# Patient Record
Sex: FEMALE | Race: WHITE | Employment: FULL TIME | URBAN - NONMETROPOLITAN AREA
[De-identification: names, ages, dates, MRNs, and addresses within clinical notes are randomized per-mention and may not be internally consistent; named-entity substitution may affect disease eponyms.]

---

## 2022-07-22 ENCOUNTER — HOSPITAL ENCOUNTER (OUTPATIENT)
Age: 27
Setting detail: OBSERVATION
Discharge: HOME | End: 2022-07-24
Attending: EMERGENCY MEDICINE | Admitting: INTERNAL MEDICINE
Payer: MEDICAID

## 2022-07-22 ENCOUNTER — APPOINTMENT (OUTPATIENT)
Dept: ULTRASOUND IMAGING | Age: 27
End: 2022-07-22
Attending: EMERGENCY MEDICINE
Payer: MEDICAID

## 2022-07-22 DIAGNOSIS — K81.0 ACUTE CHOLECYSTITIS: Primary | ICD-10-CM

## 2022-07-22 LAB
ALBUMIN SERPL-MCNC: 3.6 G/DL (ref 3.4–5)
ALBUMIN/GLOB SERPL: 0.9 {RATIO} (ref 0.8–1.7)
ALP SERPL-CCNC: 66 U/L (ref 45–117)
ALT SERPL-CCNC: 43 U/L (ref 13–56)
ANION GAP SERPL CALC-SCNC: 8 MMOL/L (ref 3–18)
APPEARANCE UR: CLEAR
AST SERPL W P-5'-P-CCNC: 26 U/L (ref 10–38)
BACTERIA URNS QL MICRO: ABNORMAL /HPF
BASOPHILS # BLD: 0 K/UL (ref 0–0.1)
BASOPHILS NFR BLD: 1 % (ref 0–2)
BILIRUB DIRECT SERPL-MCNC: <0.1 MG/DL (ref 0–0.2)
BILIRUB SERPL-MCNC: 0.3 MG/DL (ref 0.2–1)
BILIRUB UR QL: NEGATIVE
BUN SERPL-MCNC: 14 MG/DL (ref 7–18)
BUN/CREAT SERPL: 16 (ref 12–20)
CA-I BLD-MCNC: 9.2 MG/DL (ref 8.5–10.1)
CAOX CRY URNS QL MICRO: ABNORMAL
CHLORIDE SERPL-SCNC: 106 MMOL/L (ref 100–111)
CO2 SERPL-SCNC: 26 MMOL/L (ref 21–32)
COLOR UR: YELLOW
CREAT SERPL-MCNC: 0.87 MG/DL (ref 0.6–1.3)
DIFFERENTIAL METHOD BLD: ABNORMAL
EOSINOPHIL # BLD: 0.4 K/UL (ref 0–0.4)
EOSINOPHIL NFR BLD: 6 % (ref 0–5)
EPITH CASTS URNS QL MICRO: ABNORMAL /LPF (ref 0–20)
ERYTHROCYTE [DISTWIDTH] IN BLOOD BY AUTOMATED COUNT: 15.9 % (ref 11.6–14.5)
GLOBULIN SER CALC-MCNC: 3.9 G/DL (ref 2–4)
GLUCOSE SERPL-MCNC: 101 MG/DL (ref 74–99)
GLUCOSE UR STRIP.AUTO-MCNC: NEGATIVE MG/DL
HCG UR QL: NEGATIVE
HCT VFR BLD AUTO: 37.7 % (ref 35–45)
HGB BLD-MCNC: 11.8 G/DL (ref 12–16)
HGB UR QL STRIP: ABNORMAL
IMM GRANULOCYTES # BLD AUTO: 0 K/UL (ref 0–0.04)
IMM GRANULOCYTES NFR BLD AUTO: 0 % (ref 0–0.5)
KETONES UR QL STRIP.AUTO: NEGATIVE MG/DL
LEUKOCYTE ESTERASE UR QL STRIP.AUTO: NEGATIVE
LIPASE SERPL-CCNC: 128 U/L (ref 73–393)
LYMPHOCYTES # BLD: 1.7 K/UL (ref 0.9–3.6)
LYMPHOCYTES NFR BLD: 29 % (ref 21–52)
MAGNESIUM SERPL-MCNC: 2 MG/DL (ref 1.6–2.6)
MCH RBC QN AUTO: 26 PG (ref 24–34)
MCHC RBC AUTO-ENTMCNC: 31.3 G/DL (ref 31–37)
MCV RBC AUTO: 83 FL (ref 78–100)
MONOCYTES # BLD: 0.5 K/UL (ref 0.05–1.2)
MONOCYTES NFR BLD: 8 % (ref 3–10)
NEUTS SEG # BLD: 3.2 K/UL (ref 1.8–8)
NEUTS SEG NFR BLD: 56 % (ref 40–73)
NITRITE UR QL STRIP.AUTO: NEGATIVE
NRBC # BLD: 0 K/UL (ref 0–0.01)
NRBC BLD-RTO: 0 PER 100 WBC
PH UR STRIP: 6 [PH] (ref 5–8)
PLATELET # BLD AUTO: 220 K/UL (ref 135–420)
PMV BLD AUTO: 11.2 FL (ref 9.2–11.8)
POTASSIUM SERPL-SCNC: 3.6 MMOL/L (ref 3.5–5.5)
PROT SERPL-MCNC: 7.5 G/DL (ref 6.4–8.2)
PROT UR STRIP-MCNC: NEGATIVE MG/DL
RBC # BLD AUTO: 4.54 M/UL (ref 4.2–5.3)
RBC #/AREA URNS HPF: ABNORMAL /HPF (ref 0–2)
SODIUM SERPL-SCNC: 140 MMOL/L (ref 136–145)
SP GR UR REFRACTOMETRY: 1.03 (ref 1–1.03)
UROBILINOGEN UR QL STRIP.AUTO: 0.2 EU/DL (ref 0.2–1)
WBC # BLD AUTO: 5.8 K/UL (ref 4.6–13.2)
WBC URNS QL MICRO: ABNORMAL /HPF (ref 0–4)

## 2022-07-22 PROCEDURE — 74011000250 HC RX REV CODE- 250: Performed by: NURSE PRACTITIONER

## 2022-07-22 PROCEDURE — 82248 BILIRUBIN DIRECT: CPT

## 2022-07-22 PROCEDURE — 74011250636 HC RX REV CODE- 250/636: Performed by: EMERGENCY MEDICINE

## 2022-07-22 PROCEDURE — 81025 URINE PREGNANCY TEST: CPT

## 2022-07-22 PROCEDURE — 74011250636 HC RX REV CODE- 250/636: Performed by: SURGERY

## 2022-07-22 PROCEDURE — 74011000258 HC RX REV CODE- 258: Performed by: NURSE PRACTITIONER

## 2022-07-22 PROCEDURE — 83735 ASSAY OF MAGNESIUM: CPT

## 2022-07-22 PROCEDURE — 85025 COMPLETE CBC W/AUTO DIFF WBC: CPT

## 2022-07-22 PROCEDURE — 81001 URINALYSIS AUTO W/SCOPE: CPT

## 2022-07-22 PROCEDURE — 83690 ASSAY OF LIPASE: CPT

## 2022-07-22 PROCEDURE — 76705 ECHO EXAM OF ABDOMEN: CPT

## 2022-07-22 PROCEDURE — 80053 COMPREHEN METABOLIC PANEL: CPT

## 2022-07-22 PROCEDURE — 36415 COLL VENOUS BLD VENIPUNCTURE: CPT

## 2022-07-22 PROCEDURE — 99285 EMERGENCY DEPT VISIT HI MDM: CPT

## 2022-07-22 PROCEDURE — 96374 THER/PROPH/DIAG INJ IV PUSH: CPT

## 2022-07-22 PROCEDURE — 74011000258 HC RX REV CODE- 258: Performed by: EMERGENCY MEDICINE

## 2022-07-22 PROCEDURE — 65270000029 HC RM PRIVATE

## 2022-07-22 PROCEDURE — G0378 HOSPITAL OBSERVATION PER HR: HCPCS

## 2022-07-22 PROCEDURE — 74011250636 HC RX REV CODE- 250/636: Performed by: NURSE PRACTITIONER

## 2022-07-22 PROCEDURE — 96376 TX/PRO/DX INJ SAME DRUG ADON: CPT

## 2022-07-22 PROCEDURE — 96375 TX/PRO/DX INJ NEW DRUG ADDON: CPT

## 2022-07-22 RX ORDER — ONDANSETRON 2 MG/ML
4 INJECTION INTRAMUSCULAR; INTRAVENOUS
Status: DISCONTINUED | OUTPATIENT
Start: 2022-07-22 | End: 2022-07-24 | Stop reason: HOSPADM

## 2022-07-22 RX ORDER — DEXTROSE, SODIUM CHLORIDE, AND POTASSIUM CHLORIDE 5; .45; .15 G/100ML; G/100ML; G/100ML
125 INJECTION INTRAVENOUS CONTINUOUS
Status: DISCONTINUED | OUTPATIENT
Start: 2022-07-22 | End: 2022-07-24 | Stop reason: HOSPADM

## 2022-07-22 RX ORDER — MORPHINE SULFATE 4 MG/ML
4 INJECTION, SOLUTION INTRAMUSCULAR; INTRAVENOUS
Status: COMPLETED | OUTPATIENT
Start: 2022-07-22 | End: 2022-07-22

## 2022-07-22 RX ORDER — POLYETHYLENE GLYCOL 3350 17 G/17G
17 POWDER, FOR SOLUTION ORAL DAILY PRN
Status: DISCONTINUED | OUTPATIENT
Start: 2022-07-22 | End: 2022-07-23

## 2022-07-22 RX ORDER — NORGESTIMATE AND ETHINYL ESTRADIOL 0.25-0.035
1 KIT ORAL DAILY
COMMUNITY

## 2022-07-22 RX ORDER — SODIUM CHLORIDE 450 MG/100ML
100 INJECTION, SOLUTION INTRAVENOUS CONTINUOUS
Status: DISCONTINUED | OUTPATIENT
Start: 2022-07-22 | End: 2022-07-22

## 2022-07-22 RX ORDER — MORPHINE SULFATE 2 MG/ML
2 INJECTION, SOLUTION INTRAMUSCULAR; INTRAVENOUS
Status: DISCONTINUED | OUTPATIENT
Start: 2022-07-22 | End: 2022-07-24 | Stop reason: HOSPADM

## 2022-07-22 RX ORDER — ONDANSETRON 4 MG/1
4 TABLET, ORALLY DISINTEGRATING ORAL
Status: DISCONTINUED | OUTPATIENT
Start: 2022-07-22 | End: 2022-07-24 | Stop reason: HOSPADM

## 2022-07-22 RX ORDER — ACETAMINOPHEN 325 MG/1
650 TABLET ORAL
Status: DISCONTINUED | OUTPATIENT
Start: 2022-07-22 | End: 2022-07-24 | Stop reason: HOSPADM

## 2022-07-22 RX ORDER — ACETAMINOPHEN 650 MG/1
650 SUPPOSITORY RECTAL
Status: DISCONTINUED | OUTPATIENT
Start: 2022-07-22 | End: 2022-07-24 | Stop reason: HOSPADM

## 2022-07-22 RX ORDER — SODIUM CHLORIDE 0.9 % (FLUSH) 0.9 %
5-40 SYRINGE (ML) INJECTION EVERY 8 HOURS
Status: DISCONTINUED | OUTPATIENT
Start: 2022-07-22 | End: 2022-07-24 | Stop reason: HOSPADM

## 2022-07-22 RX ORDER — SODIUM CHLORIDE 0.9 % (FLUSH) 0.9 %
5-40 SYRINGE (ML) INJECTION AS NEEDED
Status: DISCONTINUED | OUTPATIENT
Start: 2022-07-22 | End: 2022-07-24 | Stop reason: HOSPADM

## 2022-07-22 RX ORDER — ONDANSETRON 2 MG/ML
4 INJECTION INTRAMUSCULAR; INTRAVENOUS
Status: COMPLETED | OUTPATIENT
Start: 2022-07-22 | End: 2022-07-22

## 2022-07-22 RX ADMIN — MORPHINE SULFATE 2 MG: 2 INJECTION, SOLUTION INTRAMUSCULAR; INTRAVENOUS at 23:10

## 2022-07-22 RX ADMIN — MORPHINE SULFATE 2 MG: 2 INJECTION, SOLUTION INTRAMUSCULAR; INTRAVENOUS at 19:00

## 2022-07-22 RX ADMIN — PIPERACILLIN AND TAZOBACTAM 3.38 G: 3; .375 INJECTION, POWDER, FOR SOLUTION INTRAVENOUS at 20:23

## 2022-07-22 RX ADMIN — POTASSIUM CHLORIDE, DEXTROSE MONOHYDRATE AND SODIUM CHLORIDE 125 ML/HR: 150; 5; 450 INJECTION, SOLUTION INTRAVENOUS at 16:20

## 2022-07-22 RX ADMIN — MORPHINE SULFATE 4 MG: 4 INJECTION, SOLUTION INTRAMUSCULAR; INTRAVENOUS at 11:09

## 2022-07-22 RX ADMIN — SODIUM CHLORIDE, PRESERVATIVE FREE 10 ML: 5 INJECTION INTRAVENOUS at 13:58

## 2022-07-22 RX ADMIN — SODIUM CHLORIDE 1000 ML: 9 INJECTION, SOLUTION INTRAVENOUS at 11:38

## 2022-07-22 RX ADMIN — ONDANSETRON HYDROCHLORIDE 4 MG: 2 INJECTION, SOLUTION INTRAMUSCULAR; INTRAVENOUS at 11:09

## 2022-07-22 RX ADMIN — SODIUM CHLORIDE, PRESERVATIVE FREE 10 ML: 5 INJECTION INTRAVENOUS at 21:20

## 2022-07-22 RX ADMIN — PIPERACILLIN AND TAZOBACTAM 3.38 G: 3; .375 INJECTION, POWDER, LYOPHILIZED, FOR SOLUTION INTRAVENOUS at 13:11

## 2022-07-22 RX ADMIN — SODIUM CHLORIDE 100 ML/HR: 4.5 INJECTION, SOLUTION INTRAVENOUS at 14:08

## 2022-07-22 RX ADMIN — MORPHINE SULFATE 4 MG: 4 INJECTION, SOLUTION INTRAMUSCULAR; INTRAVENOUS at 10:47

## 2022-07-22 NOTE — Clinical Note
Status[de-identified] INPATIENT [101]   Type of Bed: Surgical [18]   Cardiac Monitoring Required?: No   Inpatient Hospitalization Certified Necessary for the Following Reasons: 3. Patient receiving treatment that can only be provided in an inpatient setting (further clarification in H&P documentation)   Admitting Diagnosis: Acute cholecystitis [575. 0. ICD-9-CM]   Admitting Physician: Jose Maria Lozano [[id number]]   Attending Physician: Jose Maria Lozano [[id number]]   Estimated Length of Stay: 2 Midnights   Discharge Plan[de-identified] Home with Office Follow-up

## 2022-07-22 NOTE — ASSESSMENT & PLAN NOTE
-keep NPO, start IV hydration  -monitor electrolytes  -supportive care and pain mgt (Morphine)  -antibiotics (IV Zosyn)  -general surgery consulted  -repeat CMP and CBC in the am

## 2022-07-22 NOTE — PROGRESS NOTES
Problem: Falls - Risk of  Goal: *Absence of Falls  Description: Document Laretta Malissa Fall Risk and appropriate interventions in the flowsheet.   Outcome: Progressing Towards Goal  Note: Fall Risk Interventions:            Medication Interventions: Evaluate medications/consider consulting pharmacy

## 2022-07-22 NOTE — H&P
History and Physical    Subjective:     Ciera Velazco is a 27 y.o. female with no past medical history presents to the ED with a chief complaint of abdominal pain. Patient reports that the abdominal pain has been ongoing for 2 years, but worsened over the last 3 days. The pain is located to the right upper quadrant, sharp shooting, continuous, radiates to the right lower back, eating worsens pain, and rates pain 7/10, other accompanying symptoms includes nausea and vomiting. Patient denies chest pain, shortness of breath, diarrhea, constipation, fever, and chills. While in the ED CBC unremarkable, BMP unremarkable, and CT abdomen shown CT abdomen: Cholelithiasis, nonmobile stone in gallbladder neck, and sonographic findings which can be seen with acute cholecystitis. Enlarged common duct, without obstructing lesion seen. Clinical and laboratory correlation could best evaluate for biliary obstruction. General surgery consulted in the ED for acute cholecystitis, with plans to keep NPO, start IV fluids, IV antibiotics, and pain management, patient will be seen by general surgery in the am.    Admit to medical surgery unit. History reviewed. No pertinent past medical history. Past Surgical History:   Procedure Laterality Date    HX BACK SURGERY      Back surgery for scoliosis    HX  SECTION       History reviewed. No pertinent family history.    Social History     Tobacco Use    Smoking status: Some Days     Packs/day: 0.25     Types: Cigarettes    Smokeless tobacco: Never   Substance Use Topics    Alcohol use: Never       Prior to Admission medications    Not on File     No Known Allergies       REVIEW OF SYSTEMS:       Total of 12 systems reviewed as follows:    Positive = Red  Constitutional: Negative for malaise/fatigue and weakness, negative for fever and chills   HENT: Negative for ear pain, headaches, negative for loss of sense of taste and smell   Eyes: Negative for blurred vision and double vision   Skin: Negative for itching, negative for open areas   Cardiovascular: Negative for chest pain, palpitations, negative for swelling   Respiratory: Negative for shortness or breath, negative for cough, negative for sputum production   Gastrointestinal: Positive for abdominal pain to the right upper, nausea, and vomiting, constipation, negative vomiting and diarrhea   Genitourinary: Negative for dysuria, frequency, and hematuria   Musculoskeletal: Negative for joint pain and myalgias   Neurological: Negative for dizziness, seizures, and headaches   Psychiatric: Negative for depression and anxiousness        Objective:   VITALS:    Visit Vitals  /66   Pulse 67   Temp 97 °F (36.1 °C)   Resp 16   Ht 5' 6\" (1.676 m)   Wt 83.3 kg (183 lb 9.6 oz)   SpO2 100%   BMI 29.63 kg/m²       PHYSICAL EXAM:  Positive = Red  Constitutional: Alert and oriented x 3 and no noted acute distress appears to be stated age. HENT: Atraumatic, nose midline, oropharynx clear ad moist, trachea midline, no supraclavicular   Eyes: Conjunctiva normal and pupils equal   Skin: Dry, intact, warm, and dry   Cardiovascular: Regular rate and rhythm, normal heart sounds, no murmurs, pulses palpable, no noted edema   Respiratory: Lungs clear throughout, no wheezes, rales, or rhonchi, effort normal   Gastrointestinal: Appearance normal, bowel sounds are normal, bowl soft and tenderness noted to the RUQ upon palpation, no guarding or rebound tenderness   Genitourinary: Deferred   Musculoskeletal: Normal ROM   Neurological: Alert and oriented x 3, awake. No facial droop. No slurred speech. Hand grasps equal. Strength 5/5 in all extremities.  Intact sensations   Psychiatric: Affect normal, Answers questions appropriately     __________________________________________________  Care Plan discussed with:    Comments   Patient X    Family      RN     Care Manager                    Consultant: _______________________________________________________________________  Expected  Disposition:   Home with Family X   HH/PT/OT/RN    SNF/LTC    BELLA    ________________________________________________________________________    Labs:  Recent Results (from the past 24 hour(s))   URINALYSIS W/ RFLX MICROSCOPIC    Collection Time: 07/22/22 10:14 AM   Result Value Ref Range    Color Yellow      Appearance Clear      Specific gravity 1.030 1.005 - 1.030      pH (UA) 6.0 5.0 - 8.0      Protein Negative Negative mg/dL    Glucose Negative Negative mg/dL    Ketone Negative Negative mg/dL    Bilirubin Negative Negative      Blood Small (A) Negative      Urobilinogen 0.2 0.2 - 1.0 EU/dL    Nitrites Negative Negative      Leukocyte Esterase Negative Negative     HCG URINE, QL    Collection Time: 07/22/22 10:14 AM   Result Value Ref Range    HCG urine, QL Negative Negative     URINE MICROSCOPIC    Collection Time: 07/22/22 10:14 AM   Result Value Ref Range    WBC 0-4 0 - 4 /hpf    RBC 0-5 0 - 2 /hpf    Epithelial cells Few 0 - 20 /lpf    Bacteria 1+ (A) None /hpf    CA Oxalate crystals 2+    CBC WITH AUTOMATED DIFF    Collection Time: 07/22/22 10:17 AM   Result Value Ref Range    WBC 5.8 4.6 - 13.2 K/uL    RBC 4.54 4.20 - 5.30 M/uL    HGB 11.8 (L) 12.0 - 16.0 g/dL    HCT 37.7 35.0 - 45.0 %    MCV 83.0 78.0 - 100.0 FL    MCH 26.0 24.0 - 34.0 PG    MCHC 31.3 31.0 - 37.0 g/dL    RDW 15.9 (H) 11.6 - 14.5 %    PLATELET 728 678 - 299 K/uL    MPV 11.2 9.2 - 11.8 FL    NRBC 0.0 0.0  WBC    ABSOLUTE NRBC 0.00 0.00 - 0.01 K/uL    NEUTROPHILS 56 40 - 73 %    LYMPHOCYTES 29 21 - 52 %    MONOCYTES 8 3 - 10 %    EOSINOPHILS 6 (H) 0 - 5 %    BASOPHILS 1 0 - 2 %    IMMATURE GRANULOCYTES 0 0 - 0.5 %    ABS. NEUTROPHILS 3.2 1.8 - 8.0 K/UL    ABS. LYMPHOCYTES 1.7 0.9 - 3.6 K/UL    ABS. MONOCYTES 0.5 0.05 - 1.2 K/UL    ABS. EOSINOPHILS 0.4 0.0 - 0.4 K/UL    ABS. BASOPHILS 0.0 0.0 - 0.1 K/UL    ABS. IMM.  GRANS. 0.0 0.00 - 0.04 K/UL    DF AUTOMATED     METABOLIC PANEL, COMPREHENSIVE    Collection Time: 07/22/22 10:17 AM   Result Value Ref Range    Sodium 140 136 - 145 mmol/L    Potassium 3.6 3.5 - 5.5 mmol/L    Chloride 106 100 - 111 mmol/L    CO2 26 21 - 32 mmol/L    Anion gap 8 3.0 - 18.0 mmol/L    Glucose 101 (H) 74 - 99 mg/dL    BUN 14 7 - 18 mg/dL    Creatinine 0.87 0.60 - 1.30 mg/dL    BUN/Creatinine ratio 16 12 - 20      GFR est AA >60 >60 ml/min/1.73m2    GFR est non-AA >60 >60 ml/min/1.73m2    Calcium 9.2 8.5 - 10.1 mg/dL    Bilirubin, total 0.3 0.2 - 1.0 mg/dL    AST (SGOT) 26 10 - 38 U/L    ALT (SGPT) 43 13 - 56 U/L    Alk. phosphatase 66 45 - 117 U/L    Protein, total 7.5 6.4 - 8.2 g/dL    Albumin 3.6 3.4 - 5.0 g/dL    Globulin 3.9 2.0 - 4.0 g/dL    A-G Ratio 0.9 0.8 - 1.7     LIPASE    Collection Time: 07/22/22 10:17 AM   Result Value Ref Range    Lipase 128 73 - 393 U/L       Imaging:  US RUQ    Result Date: 7/22/2022  HISTORY: -Provided with order: RUQ abd pain, cholecystitis versus cholelithiasis versus ureteral colic -Additional: None Technique: Sonographic evaluation of the right upper quadrant was performed. This was a moderate visibility study. Comparison study: None. Findings: Liver: echotexture : Unremarkable length: 17.5 cm. Focal findings : No focal lesions are seen within limits of study. Portal vein:  Color and spectral flow analysis of the portal vein demonstrates hepatopetal flow. Gallbladder:1.3 cm nonmobile stone in the gallbladder neck. Additional mobile cholelithiasis. Mild gallbladder wall thickening (0.34 cm). No pericholecystic fluid. Positive sonographic Beverlyn Peals sign was noted by the technologist. Common duct: 0.72 cm, partially seen and not able to be followed into the pancreatic head. Intrahepatic biliary ducts:Unremarkable. Pancreas: limited in visibility, partially obscured by gas. Body and head unremarkable. Tail not well seen. IVC: patent with color Doppler signal.  Right kidney : 11.8 cm.  Focal lesions: None identified sonographically. Renal cortical thickness: Preserved. Renal cortical echogenicity: Preserved. Hydronephrosis: None identified sonographically. Renal calculi:  None identified sonographically. No free fluid is demonstrated in the upper abdomen. 1. Cholelithiasis, nonmobile stone in gallbladder neck, and sonographic findings which can be seen with acute cholecystitis. 2. Enlarged common duct, without obstructing lesion seen. Clinical and laboratory correlation could best evaluate for biliary obstruction. Assessment & Plan:     Acute cholecystitis  -CT abdomen: Cholelithiasis, nonmobile stone in gallbladder neck, and sonographic findings which can be seen with acute cholecystitis. Enlarged common duct, without obstructing lesion seen. Clinical and laboratory correlation could best evaluate for biliary obstruction.    -keep NPO, start IV hydration  -monitor electrolytes  -supportive care and pain mgt (Morphine)  -antibiotics (IV Zosyn)  -general surgery consulted  -repeat CMP and CBC in the am    Nicotine Abuse  -patient uses vape pen, offered Nicotine patch, patient decline  -cessation education provided    TOTAL TIME:  30 Minutes    Code Status: Full    Prophylaxis:  SCDs    Electronically Signed : HIRAL Fitzpatrick-BC [address]    Please note that this dictation was completed with [employer], the [employer] voice recognition software. Quite often unanticipated grammatical, syntax, homophones, and other interpretive errors are inadvertently transcribed by the computer software. Please disregard these errors. Please excuse any errors that have escaped final proofreading. Thank you.

## 2022-07-22 NOTE — PROGRESS NOTES
1330- Assumed care of patient from ED to room 248. Patient is alert and oriented. Vss. Patient denies any needs/pain at this time. Oriented to room and CB system. 1350- SCD's placed on patient and educated. IVF started. Patient denies any needs at this time. CB in reach Mother at bedside. 1630- patient lying in bed with eyes closed patient easily aroused. Denies any needs/pain at this time.

## 2022-07-22 NOTE — ED PROVIDER NOTES
The patient is a 17-year-old woman with past medical history significant for scoliosis status post back surgery, and a  section in , who presents to the ED today with right upper quadrant abdominal pain that is underneath her right rib that radiates to her chest and through her back. She states that the pain has been present since she was pregnant with her daughter but the pain has become more severe over the past 3 days. She describes it as sharp and stabbing. Prior to now, during her episodes of pain, she was able to go to sleep and feel better by the morning. This time her pain has not subsided and she presents to the ED because she is very concerned. She has had some nausea and she does make herself throw up because vomiting makes the pain better. She denies any diarrhea or dysuria. She also states that the pain is worse with eating but eating any particular foods. History reviewed. No pertinent past medical history. Past Surgical History:   Procedure Laterality Date    HX BACK SURGERY      Back surgery for scoliosis    HX  SECTION           History reviewed. No pertinent family history.     Social History     Socioeconomic History    Marital status: SINGLE     Spouse name: Not on file    Number of children: Not on file    Years of education: Not on file    Highest education level: Not on file   Occupational History    Not on file   Tobacco Use    Smoking status: Some Days     Packs/day: 0.25     Types: Cigarettes    Smokeless tobacco: Never   Substance and Sexual Activity    Alcohol use: Never    Drug use: Yes     Types: Marijuana    Sexual activity: Yes   Other Topics Concern    Not on file   Social History Narrative    Not on file     Social Determinants of Health     Financial Resource Strain: Not on file   Food Insecurity: Not on file   Transportation Needs: Not on file   Physical Activity: Not on file   Stress: Not on file   Social Connections: Not on file Intimate Partner Violence: Not on file   Housing Stability: Not on file         ALLERGIES: Patient has no known allergies. Review of Systems   All other systems reviewed and are negative. Vitals:    07/22/22 1006   BP: (!) 141/81   Pulse: 81   Resp: 16   Temp: 98 °F (36.7 °C)   SpO2: 99%   Weight: 83.3 kg (183 lb 9.6 oz)   Height: 5' 6\" (1.676 m)            Physical Exam  Vitals and nursing note reviewed. Constitutional:       Appearance: Normal appearance. HENT:      Head: Normocephalic and atraumatic. Right Ear: External ear normal.      Left Ear: External ear normal.      Nose: Nose normal.      Mouth/Throat:      Mouth: Mucous membranes are moist.      Pharynx: Oropharynx is clear. Eyes:      Extraocular Movements: Extraocular movements intact. Conjunctiva/sclera: Conjunctivae normal.      Pupils: Pupils are equal, round, and reactive to light. Cardiovascular:      Rate and Rhythm: Normal rate and regular rhythm. Pulses: Normal pulses. Heart sounds: Normal heart sounds. Pulmonary:      Effort: Pulmonary effort is normal.      Breath sounds: Normal breath sounds. Abdominal:      General: Abdomen is flat. Bowel sounds are normal.      Palpations: Abdomen is soft. Tenderness: There is abdominal tenderness. There is no right CVA tenderness, left CVA tenderness, guarding or rebound. Comments: Right upper quadrant tenderness to palpation   Musculoskeletal:         General: Normal range of motion. Cervical back: Normal range of motion and neck supple. Skin:     General: Skin is warm and dry. Neurological:      General: No focal deficit present. Mental Status: She is alert and oriented to person, place, and time. Psychiatric:         Mood and Affect: Mood normal.         Behavior: Behavior normal.         Thought Content:  Thought content normal.         Judgment: Judgment normal.     Recent Results (from the past 12 hour(s))   URINALYSIS W/ RFLX MICROSCOPIC    Collection Time: 07/22/22 10:14 AM   Result Value Ref Range    Color Yellow      Appearance Clear      Specific gravity 1.030 1.005 - 1.030      pH (UA) 6.0 5.0 - 8.0      Protein Negative Negative mg/dL    Glucose Negative Negative mg/dL    Ketone Negative Negative mg/dL    Bilirubin Negative Negative      Blood Small (A) Negative      Urobilinogen 0.2 0.2 - 1.0 EU/dL    Nitrites Negative Negative      Leukocyte Esterase Negative Negative     HCG URINE, QL    Collection Time: 07/22/22 10:14 AM   Result Value Ref Range    HCG urine, QL Negative Negative     URINE MICROSCOPIC    Collection Time: 07/22/22 10:14 AM   Result Value Ref Range    WBC 0-4 0 - 4 /hpf    RBC 0-5 0 - 2 /hpf    Epithelial cells Few 0 - 20 /lpf    Bacteria 1+ (A) None /hpf    CA Oxalate crystals 2+    CBC WITH AUTOMATED DIFF    Collection Time: 07/22/22 10:17 AM   Result Value Ref Range    WBC 5.8 4.6 - 13.2 K/uL    RBC 4.54 4.20 - 5.30 M/uL    HGB 11.8 (L) 12.0 - 16.0 g/dL    HCT 37.7 35.0 - 45.0 %    MCV 83.0 78.0 - 100.0 FL    MCH 26.0 24.0 - 34.0 PG    MCHC 31.3 31.0 - 37.0 g/dL    RDW 15.9 (H) 11.6 - 14.5 %    PLATELET 947 748 - 096 K/uL    MPV 11.2 9.2 - 11.8 FL    NRBC 0.0 0.0  WBC    ABSOLUTE NRBC 0.00 0.00 - 0.01 K/uL    NEUTROPHILS 56 40 - 73 %    LYMPHOCYTES 29 21 - 52 %    MONOCYTES 8 3 - 10 %    EOSINOPHILS 6 (H) 0 - 5 %    BASOPHILS 1 0 - 2 %    IMMATURE GRANULOCYTES 0 0 - 0.5 %    ABS. NEUTROPHILS 3.2 1.8 - 8.0 K/UL    ABS. LYMPHOCYTES 1.7 0.9 - 3.6 K/UL    ABS. MONOCYTES 0.5 0.05 - 1.2 K/UL    ABS. EOSINOPHILS 0.4 0.0 - 0.4 K/UL    ABS. BASOPHILS 0.0 0.0 - 0.1 K/UL    ABS. IMM.  GRANS. 0.0 0.00 - 0.04 K/UL    DF AUTOMATED     METABOLIC PANEL, COMPREHENSIVE    Collection Time: 07/22/22 10:17 AM   Result Value Ref Range    Sodium 140 136 - 145 mmol/L    Potassium 3.6 3.5 - 5.5 mmol/L    Chloride 106 100 - 111 mmol/L    CO2 26 21 - 32 mmol/L    Anion gap 8 3.0 - 18.0 mmol/L    Glucose 101 (H) 74 - 99 mg/dL BUN 14 7 - 18 mg/dL    Creatinine 0.87 0.60 - 1.30 mg/dL    BUN/Creatinine ratio 16 12 - 20      GFR est AA >60 >60 ml/min/1.73m2    GFR est non-AA >60 >60 ml/min/1.73m2    Calcium 9.2 8.5 - 10.1 mg/dL    Bilirubin, total 0.3 0.2 - 1.0 mg/dL    AST (SGOT) 26 10 - 38 U/L    ALT (SGPT) 43 13 - 56 U/L    Alk. phosphatase 66 45 - 117 U/L    Protein, total 7.5 6.4 - 8.2 g/dL    Albumin 3.6 3.4 - 5.0 g/dL    Globulin 3.9 2.0 - 4.0 g/dL    A-G Ratio 0.9 0.8 - 1.7     LIPASE    Collection Time: 22 10:17 AM   Result Value Ref Range    Lipase 128 73 - 393 U/L     US RUQ   Final Result      1. Cholelithiasis, nonmobile stone in gallbladder neck, and sonographic findings   which can be seen with acute cholecystitis. 2. Enlarged common duct, without obstructing lesion seen. Clinical and   laboratory correlation could best evaluate for biliary obstruction. MDM  Number of Diagnoses or Management Options  Diagnosis management comments: The patient is a 82-year-old woman with past medical history significant for scoliosis surgery and a prior , who presents the ED today with right upper quadrant abdominal pain that has been present for the past 3 days. Her RUQ U/S shows a large nonmoblie stone in the GB neck with signs of acute gilbert. I consulted Dr. Oliver Thorpe who will follow and requests that we admit the patient to the hospitalist service. She has received IV Zosyn.            Procedures

## 2022-07-22 NOTE — ED TRIAGE NOTES
Pt c/o of shooting pain in the right side under rib cage that radiates to her back, states it started three days ago and got more severe earlier today

## 2022-07-22 NOTE — ED NOTES
TRANSFER - OUT REPORT:    Verbal report given to maida(name) on Joann Javier  being transferred to [address] [place] [id number] (unit) for routine progression of care       Report consisted of patients Situation, Background, Assessment and   Recommendations(SBAR). Information from the following report(s) ED Summary was reviewed with the receiving nurse. Lines:   Peripheral IV 07/22/22 Anterior;Right Forearm (Active)        Opportunity for questions and clarification was provided.       Patient transported with:   w/c

## 2022-07-23 LAB
ALBUMIN SERPL-MCNC: 2.9 G/DL (ref 3.4–5)
ALBUMIN/GLOB SERPL: 1 {RATIO} (ref 0.8–1.7)
ALP SERPL-CCNC: 39 U/L (ref 45–117)
ALT SERPL-CCNC: 35 U/L (ref 13–56)
ANION GAP SERPL CALC-SCNC: 4 MMOL/L (ref 3–18)
AST SERPL W P-5'-P-CCNC: 21 U/L (ref 10–38)
BASOPHILS # BLD: 0 K/UL (ref 0–0.1)
BASOPHILS NFR BLD: 1 % (ref 0–2)
BILIRUB DIRECT SERPL-MCNC: 0.2 MG/DL (ref 0–0.2)
BILIRUB SERPL-MCNC: 0.7 MG/DL (ref 0.2–1)
BUN SERPL-MCNC: 8 MG/DL (ref 7–18)
BUN/CREAT SERPL: 11 (ref 12–20)
CA-I BLD-MCNC: 8.2 MG/DL (ref 8.5–10.1)
CHLORIDE SERPL-SCNC: 106 MMOL/L (ref 100–111)
CO2 SERPL-SCNC: 27 MMOL/L (ref 21–32)
CREAT SERPL-MCNC: 0.76 MG/DL (ref 0.6–1.3)
DIFFERENTIAL METHOD BLD: ABNORMAL
EOSINOPHIL # BLD: 0.3 K/UL (ref 0–0.4)
EOSINOPHIL NFR BLD: 8 % (ref 0–5)
ERYTHROCYTE [DISTWIDTH] IN BLOOD BY AUTOMATED COUNT: 15.4 % (ref 11.6–14.5)
GLOBULIN SER CALC-MCNC: 3 G/DL (ref 2–4)
GLUCOSE SERPL-MCNC: 108 MG/DL (ref 74–99)
HCT VFR BLD AUTO: 31.9 % (ref 35–45)
HGB BLD-MCNC: 10.1 G/DL (ref 12–16)
IMM GRANULOCYTES # BLD AUTO: 0 K/UL (ref 0–0.04)
IMM GRANULOCYTES NFR BLD AUTO: 0 % (ref 0–0.5)
LYMPHOCYTES # BLD: 1.9 K/UL (ref 0.9–3.6)
LYMPHOCYTES NFR BLD: 42 % (ref 21–52)
MAGNESIUM SERPL-MCNC: 2 MG/DL (ref 1.6–2.6)
MCH RBC QN AUTO: 26.2 PG (ref 24–34)
MCHC RBC AUTO-ENTMCNC: 31.7 G/DL (ref 31–37)
MCV RBC AUTO: 82.9 FL (ref 78–100)
MONOCYTES # BLD: 0.4 K/UL (ref 0.05–1.2)
MONOCYTES NFR BLD: 8 % (ref 3–10)
NEUTS SEG # BLD: 1.8 K/UL (ref 1.8–8)
NEUTS SEG NFR BLD: 41 % (ref 40–73)
NRBC # BLD: 0 K/UL (ref 0–0.01)
NRBC BLD-RTO: 0 PER 100 WBC
PLATELET # BLD AUTO: 148 K/UL (ref 135–420)
PMV BLD AUTO: 11.6 FL (ref 9.2–11.8)
POTASSIUM SERPL-SCNC: 3.8 MMOL/L (ref 3.5–5.5)
PROT SERPL-MCNC: 5.9 G/DL (ref 6.4–8.2)
RBC # BLD AUTO: 3.85 M/UL (ref 4.2–5.3)
SODIUM SERPL-SCNC: 137 MMOL/L (ref 136–145)
WBC # BLD AUTO: 4.4 K/UL (ref 4.6–13.2)

## 2022-07-23 PROCEDURE — 74011000250 HC RX REV CODE- 250: Performed by: SURGERY

## 2022-07-23 PROCEDURE — 74011250637 HC RX REV CODE- 250/637: Performed by: NURSE PRACTITIONER

## 2022-07-23 PROCEDURE — 74011000250 HC RX REV CODE- 250: Performed by: NURSE PRACTITIONER

## 2022-07-23 PROCEDURE — 74011250636 HC RX REV CODE- 250/636: Performed by: SURGERY

## 2022-07-23 PROCEDURE — G0378 HOSPITAL OBSERVATION PER HR: HCPCS

## 2022-07-23 PROCEDURE — 82248 BILIRUBIN DIRECT: CPT

## 2022-07-23 PROCEDURE — 65270000029 HC RM PRIVATE

## 2022-07-23 PROCEDURE — C9113 INJ PANTOPRAZOLE SODIUM, VIA: HCPCS | Performed by: SURGERY

## 2022-07-23 PROCEDURE — 96376 TX/PRO/DX INJ SAME DRUG ADON: CPT

## 2022-07-23 PROCEDURE — 85025 COMPLETE CBC W/AUTO DIFF WBC: CPT

## 2022-07-23 PROCEDURE — 74011000258 HC RX REV CODE- 258: Performed by: NURSE PRACTITIONER

## 2022-07-23 PROCEDURE — 80053 COMPREHEN METABOLIC PANEL: CPT

## 2022-07-23 PROCEDURE — 83735 ASSAY OF MAGNESIUM: CPT

## 2022-07-23 PROCEDURE — 74011250636 HC RX REV CODE- 250/636: Performed by: NURSE PRACTITIONER

## 2022-07-23 PROCEDURE — 36415 COLL VENOUS BLD VENIPUNCTURE: CPT

## 2022-07-23 RX ADMIN — ONDANSETRON 4 MG: 2 INJECTION INTRAMUSCULAR; INTRAVENOUS at 16:55

## 2022-07-23 RX ADMIN — SODIUM CHLORIDE, PRESERVATIVE FREE 10 ML: 5 INJECTION INTRAVENOUS at 05:38

## 2022-07-23 RX ADMIN — PIPERACILLIN AND TAZOBACTAM 3.38 G: 3; .375 INJECTION, POWDER, FOR SOLUTION INTRAVENOUS at 19:36

## 2022-07-23 RX ADMIN — SODIUM CHLORIDE, PRESERVATIVE FREE 40 MG: 5 INJECTION INTRAVENOUS at 08:09

## 2022-07-23 RX ADMIN — POTASSIUM CHLORIDE, DEXTROSE MONOHYDRATE AND SODIUM CHLORIDE 125 ML/HR: 150; 5; 450 INJECTION, SOLUTION INTRAVENOUS at 08:05

## 2022-07-23 RX ADMIN — ACETAMINOPHEN 650 MG: 325 TABLET ORAL at 16:04

## 2022-07-23 RX ADMIN — MORPHINE SULFATE 2 MG: 2 INJECTION, SOLUTION INTRAMUSCULAR; INTRAVENOUS at 06:29

## 2022-07-23 RX ADMIN — SODIUM CHLORIDE, PRESERVATIVE FREE 10 ML: 5 INJECTION INTRAVENOUS at 13:11

## 2022-07-23 RX ADMIN — POTASSIUM CHLORIDE, DEXTROSE MONOHYDRATE AND SODIUM CHLORIDE 125 ML/HR: 150; 5; 450 INJECTION, SOLUTION INTRAVENOUS at 00:13

## 2022-07-23 RX ADMIN — ACETAMINOPHEN 650 MG: 325 TABLET ORAL at 08:10

## 2022-07-23 RX ADMIN — PIPERACILLIN AND TAZOBACTAM 3.38 G: 3; .375 INJECTION, POWDER, FOR SOLUTION INTRAVENOUS at 03:40

## 2022-07-23 RX ADMIN — POTASSIUM CHLORIDE, DEXTROSE MONOHYDRATE AND SODIUM CHLORIDE 125 ML/HR: 150; 5; 450 INJECTION, SOLUTION INTRAVENOUS at 23:16

## 2022-07-23 RX ADMIN — SODIUM CHLORIDE, PRESERVATIVE FREE 10 ML: 5 INJECTION INTRAVENOUS at 21:26

## 2022-07-23 RX ADMIN — PIPERACILLIN AND TAZOBACTAM 3.38 G: 3; .375 INJECTION, POWDER, FOR SOLUTION INTRAVENOUS at 11:47

## 2022-07-23 RX ADMIN — MORPHINE SULFATE 2 MG: 2 INJECTION, SOLUTION INTRAMUSCULAR; INTRAVENOUS at 16:47

## 2022-07-23 NOTE — PROGRESS NOTES
Hospitalist Progress Note             Date of Service:  2022  NAME:  Emilio Downey  :  1995  MRN:  [id number]    Assessment & Plan:      Acute cholecystitis  -CT abdomen: Cholelithiasis, nonmobile stone in gallbladder neck, and sonographic findings which can be seen with acute cholecystitis. Enlarged common duct, without obstructing lesion seen. Clinical and laboratory correlation could best evaluate for biliary obstruction.    -cont ivf, start clears, pain greatly improved  -monitor electrolytes  -supportive care and pain mgt (Morphine)  -antibiotics (IV Zosyn)  -general surgery consulted- iv abx one more day, then if pain resolved, no fevers, ok for homw with po cipro for 1 week, fu dr Brandon Alicea, plan for salvage lap gilbert in 6 weeks  -repeat CMP and CBC in the am     Nicotine Abuse  -patient uses vape pen, offered Nicotine patch, patient decline  -cessation education provided     TOTAL TIME:  [address] Problems  Never Reviewed            Codes Class Noted POA    Acute cholecystitis ICD-10-CM: K81.0  ICD-9-CM: 575.0  2022 Unknown             Review of Systems:   A comprehensive review of systems was negative except for that written in the HPI.   + headache      Vital Signs:    Last 24hrs VS reviewed since prior progress note. Most recent are:  Visit Vitals  /70 (BP 1 Location: Left upper arm, BP Patient Position: Supine)   Pulse 78   Temp 97 °F (36.1 °C)   Resp 18   Ht 5' 6\" (1.676 m)   Wt 83.3 kg (183 lb 9.6 oz)   SpO2 99%   BMI 29.63 kg/m²       No intake or output data in the 24 hours ending 22 1054     Physical Examination:             General:          Alert, cooperative, no distress, appears stated age.      HEENT:           Atraumatic, anicteric sclerae, pink conjunctivae                          No oral ulcers, mucosa moist, throat clear, dentition fair  Neck: Supple, symmetrical  Lungs:             Clear to auscultation bilaterally. No Wheezing or Rhonchi. No rales. Chest wall:      No tenderness  No Accessory muscle use. Heart:              Regular  rhythm,  No  murmur   No edema  Abdomen:        Soft, mild ruq tender. Not distended. Bowel sounds normal  Extremities:     No cyanosis. No clubbing,                            Skin turgor normal, Capillary refill normal  Skin:                Not pale. Not Jaundiced  No rashes   Psych:             Not anxious or agitated. Neurologic:      Alert, moves all extremities, answers questions appropriately and responds to commands        Data Review:    Review and/or order of clinical lab test  Review and/or order of tests in the radiology section of [facility]  Review and/or order of tests in the medicine section of [facility]      Labs:     Recent Labs     07/23/22  0500 07/22/22  1017   WBC 4.4* 5.8   HGB 10.1* 11.8*   HCT 31.9* 37.7    220     Recent Labs     07/23/22  0500 07/22/22  1017    140   K 3.8 3.6    106   CO2 27 26   BUN 8 14   CREA 0.76 0.87   * 101*   CA 8.2* 9.2   MG 2.0 2.0     Recent Labs     07/23/22  0500 07/22/22  1017   ALT 35 43   AP 39* 66   TBILI 0.7 0.3   TP 5.9* 7.5   ALB 2.9* 3.6   GLOB 3.0 3.9   LPSE  --  128     No results for input(s): INR, PTP, APTT, INREXT in the last 72 hours. No results for input(s): FE, TIBC, PSAT, FERR in the last 72 hours. No results found for: FOL, RBCF   No results for input(s): PH, PCO2, PO2 in the last 72 hours. No results for input(s): CPK, CKNDX, TROIQ in the last 72 hours.     No lab exists for component: CPKMB  No results found for: CHOL, CHOLX, CHLST, CHOLV, HDL, HDLP, LDL, LDLC, DLDLP, TGLX, TRIGL, TRIGP, CHHD, CHHDX  No results found for: GLUCPOC  Lab Results   Component Value Date/Time    Color Yellow 07/22/2022 10:14 AM    Appearance Clear 07/22/2022 10:14 AM    Specific gravity 1.030 07/22/2022 10:14 AM    pH (UA) 6.0 07/22/2022 10:14 AM Protein Negative 07/22/2022 10:14 AM    Glucose Negative 07/22/2022 10:14 AM    Ketone Negative 07/22/2022 10:14 AM    Bilirubin Negative 07/22/2022 10:14 AM    Urobilinogen 0.2 07/22/2022 10:14 AM    Nitrites Negative 07/22/2022 10:14 AM    Leukocyte Esterase Negative 07/22/2022 10:14 AM    Epithelial cells Few 07/22/2022 10:14 AM    Bacteria 1+ (A) 07/22/2022 10:14 AM    WBC 0-4 07/22/2022 10:14 AM    RBC 0-5 07/22/2022 10:14 AM         Medications Reviewed:     Current Facility-Administered Medications   Medication Dose Route Frequency    morphine injection 2 mg  2 mg IntraVENous Q4H PRN    sodium chloride (NS) flush 5-40 mL  5-40 mL IntraVENous Q8H    sodium chloride (NS) flush 5-40 mL  5-40 mL IntraVENous PRN    acetaminophen (TYLENOL) tablet 650 mg  650 mg Oral Q6H PRN    Or    acetaminophen (TYLENOL) suppository 650 mg  650 mg Rectal Q6H PRN    ondansetron (ZOFRAN ODT) tablet 4 mg  4 mg Oral Q8H PRN    Or    ondansetron (ZOFRAN) injection 4 mg  4 mg IntraVENous Q6H PRN    piperacillin-tazobactam (ZOSYN) 3.375 g in 0.9% sodium chloride (MBP/ADV) 100 mL MBP  3.375 g IntraVENous Q8H    pantoprazole (PROTONIX) 40 mg in 0.9% sodium chloride 10 mL injection  40 mg IntraVENous DAILY    dextrose 5% - 0.45% NaCl with KCl 20 mEq/L infusion  125 mL/hr IntraVENous CONTINUOUS     ______________________________________________________________________  EXPECTED LENGTH OF STAY: - - -  ACTUAL LENGTH OF STAY:          1                 Lacy Aly MD

## 2022-07-23 NOTE — CONSULTS
Consult Date: 2022    Consults 24-year-old female who presented to the emergency department with a 4-day history of persistent midepigastric and right upper quadrant discomfort. Patient states she has had similar episodes in the past and has reported knowing that she has had gallstones since her last pregnancy. Patient denied any other gastrointestinal symptoms. Right upper quadrant ultrasound demonstrated thickening of the gallbladder wall with cholelithiasis. All LFTs were within normal limits. Subjective     History reviewed. No pertinent past medical history. Past Surgical History:   Procedure Laterality Date    HX BACK SURGERY      Back surgery for scoliosis    HX  SECTION       History reviewed. No pertinent family history.    Social History     Tobacco Use    Smoking status: Some Days     Packs/day: 0.25     Types: Cigarettes    Smokeless tobacco: Never   Substance Use Topics    Alcohol use: Never       Current Facility-Administered Medications   Medication Dose Route Frequency Provider Last Rate Last Admin    morphine injection 2 mg  2 mg IntraVENous Q4H PRN HUMA De GuzmanP   2 mg at 22 3783    sodium chloride (NS) flush 5-40 mL  5-40 mL IntraVENous Q8H Mikhail Rothman ACNP   10 mL at 22 0538    sodium chloride (NS) flush 5-40 mL  5-40 mL IntraVENous PRN HIRAL De Guzman        acetaminophen (TYLENOL) tablet 650 mg  650 mg Oral Q6H PRN HUMA De GuzmanP   650 mg at 22 6678    Or    acetaminophen (TYLENOL) suppository 650 mg  650 mg Rectal Q6H PRN Mikhail Rothman ACNP        ondansetron (ZOFRAN ODT) tablet 4 mg  4 mg Oral Q8H PRN Senait Rothman ACNP        Or    ondansetron (ZOFRAN) injection 4 mg  4 mg IntraVENous Q6H PRN Senait Rothman ACNP        piperacillin-tazobactam (ZOSYN) 3.375 g in 0.9% sodium chloride (MBP/ADV) 100 mL MBP  3.375 g IntraVENous Q8H Senait Rothman ACNP 25 mL/hr at 22 0340 3.375 g at 07/23/22 0340    pantoprazole (PROTONIX) 40 mg in 0.9% sodium chloride 10 mL injection  40 mg IntraVENous DAILY Juice Ayon MD   40 mg at 07/23/22 0809    dextrose 5% - 0.45% NaCl with KCl 20 mEq/L infusion  125 mL/hr IntraVENous CONTINUOUS Juice Ayon  mL/hr at 07/23/22 0805 125 mL/hr at 07/23/22 0805        Review of Systems   All other systems reviewed and are negative. Objective     Vital signs for last 24 hours:  Visit Vitals  /70 (BP 1 Location: Left upper arm, BP Patient Position: Supine)   Pulse 78   Temp 97 °F (36.1 °C)   Resp 18   Ht 5' 6\" (1.676 m)   Wt 83.3 kg (183 lb 9.6 oz)   LMP 07/14/2022   SpO2 99%   BMI 29.63 kg/m²       Intake/Output this shift:  Current Shift: No intake/output data recorded. Last 3 Shifts: No intake/output data recorded. Data Review:   Recent Results (from the past 24 hour(s))   CBC WITH AUTOMATED DIFF    Collection Time: 07/23/22  5:00 AM   Result Value Ref Range    WBC 4.4 (L) 4.6 - 13.2 K/uL    RBC 3.85 (L) 4.20 - 5.30 M/uL    HGB 10.1 (L) 12.0 - 16.0 g/dL    HCT 31.9 (L) 35.0 - 45.0 %    MCV 82.9 78.0 - 100.0 FL    MCH 26.2 24.0 - 34.0 PG    MCHC 31.7 31.0 - 37.0 g/dL    RDW 15.4 (H) 11.6 - 14.5 %    PLATELET 952 949 - 439 K/uL    MPV 11.6 9.2 - 11.8 FL    NRBC 0.0 0.0  WBC    ABSOLUTE NRBC 0.00 0.00 - 0.01 K/uL    NEUTROPHILS 41 40 - 73 %    LYMPHOCYTES 42 21 - 52 %    MONOCYTES 8 3 - 10 %    EOSINOPHILS 8 (H) 0 - 5 %    BASOPHILS 1 0 - 2 %    IMMATURE GRANULOCYTES 0 0 - 0.5 %    ABS. NEUTROPHILS 1.8 1.8 - 8.0 K/UL    ABS. LYMPHOCYTES 1.9 0.9 - 3.6 K/UL    ABS. MONOCYTES 0.4 0.05 - 1.2 K/UL    ABS. EOSINOPHILS 0.3 0.0 - 0.4 K/UL    ABS. BASOPHILS 0.0 0.0 - 0.1 K/UL    ABS. IMM.  GRANS. 0.0 0.00 - 0.04 K/UL    DF AUTOMATED     METABOLIC PANEL, COMPREHENSIVE    Collection Time: 07/23/22  5:00 AM   Result Value Ref Range    Sodium 137 136 - 145 mmol/L    Potassium 3.8 3.5 - 5.5 mmol/L    Chloride 106 100 - 111 mmol/L    CO2 27 21 - 32 mmol/L Anion gap 4 3.0 - 18.0 mmol/L    Glucose 108 (H) 74 - 99 mg/dL    BUN 8 7 - 18 mg/dL    Creatinine 0.76 0.60 - 1.30 mg/dL    BUN/Creatinine ratio 11 (L) 12 - 20      GFR est AA >60 >60 ml/min/1.73m2    GFR est non-AA >60 >60 ml/min/1.73m2    Calcium 8.2 (L) 8.5 - 10.1 mg/dL    Bilirubin, total 0.7 0.2 - 1.0 mg/dL    AST (SGOT) 21 10 - 38 U/L    ALT (SGPT) 35 13 - 56 U/L    Alk. phosphatase 39 (L) 45 - 117 U/L    Protein, total 5.9 (L) 6.4 - 8.2 g/dL    Albumin 2.9 (L) 3.4 - 5.0 g/dL    Globulin 3.0 2.0 - 4.0 g/dL    A-G Ratio 1.0 0.8 - 1.7     BILIRUBIN, DIRECT    Collection Time: 07/23/22  5:00 AM   Result Value Ref Range    Bilirubin, direct 0.2 0.0 - 0.2 mg/dL   MAGNESIUM    Collection Time: 07/23/22  5:00 AM   Result Value Ref Range    Magnesium 2.0 1.6 - 2.6 mg/dL       Physical Exam  Constitutional:       Appearance: Normal appearance. HENT:      Head: Normocephalic. Mouth/Throat:      Mouth: Mucous membranes are moist.   Eyes:      Pupils: Pupils are equal, round, and reactive to light. Cardiovascular:      Rate and Rhythm: Normal rate and regular rhythm. Pulses: Normal pulses. Heart sounds: Normal heart sounds. Pulmonary:      Effort: Pulmonary effort is normal.   Abdominal:      General: Abdomen is flat. Palpations: Abdomen is soft. Musculoskeletal:         General: Normal range of motion. Skin:     General: Skin is warm. Capillary Refill: Capillary refill takes less than 2 seconds. Neurological:      General: No focal deficit present. Mental Status: She is alert. Psychiatric:         Mood and Affect: Mood normal.     Assessment: 61-year-old female with delayed presentation cholecystitis secondary to cholelithiasis with thickening of the gallbladder wall and cholelithiasis by right upper quadrant ultrasound.     Plan: I have discussed with the hospitalist as well as the patient at the bedside that given her delayed presentation and severity of cholecystitis by ultrasound as well as the nature of her persistent symptomatology over the last 4 days that we would defer to a local salvage laparoscopic cholecystectomy in approximately 6 to 8 weeks. We will treat her with broad-spectrum IV antibiotics, n.p.o., adequate hydration, adequate analgesia, adequate stress ulcer prophylaxis, and maintenance of electrolytes. Once her pain resolves without any pain meds we could anticipate advancement to a clear liquid diet. We would also anticipate 1 week worth of oral antibiotics upon discharge given the ultrasound findings and the nature of her clinical presentation. Patient understands and agrees.

## 2022-07-23 NOTE — PROGRESS NOTES
1900 -Shift change report received from [facility] BANGOR LPN. PRN morphine administered for 5/10 abdominal pain. 2013 - Vital signs assessed. Whiteboard updated. Call light in reach. 2321 - Vital signs assessed. PRN morphine administered for abdominal pain    0013 - New bag continuous fluids infusing. Patient ambulated independently to bathroom. Call light in reach. 0340 - Zosyn infusing. Patient denies pain. Call light in reach.      0630 - PRN morphine administered for 5/10 abdominal pain

## 2022-07-23 NOTE — PROGRESS NOTES
Care Management Interventions  PCP Verified by CM: No (Pt will need to be assigned a PCP prior to discharge. )  Palliative Care Criteria Met (RRAT>21 & CHF Dx)?: No  Mode of Transport at Discharge: Other (see comment) (Friend)  Transition of Care Consult (CM Consult): Discharge Planning  MyChart Signup: No  Discharge Durable Medical Equipment: No  Health Maintenance Reviewed: Yes  Physical Therapy Consult: No  Occupational Therapy Consult: No  Speech Therapy Consult: No  Support Systems: Friend/Neighbor  Confirm Follow Up Transport: Friends  The Patient and/or Patient Representative was Provided with a Choice of Provider and Agrees with the Discharge Plan?: Yes  Freedom of Choice List was Provided with Basic Dialogue that Supports the Patient's Individualized Plan of Care/Goals, Treatment Preferences and Shares the Quality Data Associated with the Providers?: Yes  Discharge Location  Patient Expects to be Discharged to[de-identified] Home  Reason for Admission: Chart reviewed and noted patient presents with C/O abdominal pain. Pt states the abd pain has been ongoing for 2 years, but has worsened over the last 3 days. The pain is located to the right upper quadrant, sharp shooting, continuous, radiates to the right lower back and eating worsens the pain. She is also having N&V. CT abdomen shows cholelithiasis, non-mobile stone in the gallbladder neck, and sonographic findings which can be seen with acute cholecystitis. Enlarged common duct, without obstructing lesion seen. Dx: Acute Cholecystitis    PMH: None                     RUR Score: 7%                   Plan for utilizing home health:  TBD        PCP: First and Last name:  None- Pt will need to be assigned a PCP prior to discharge.       Name of Practice:    Are you a current patient: Yes/No:    Approximate date of last visit:    Can you participate in a virtual visit with your PCP:                     Current Advanced Directive/Advance Care Plan: Full Code- No ACP      Healthcare Decision Maker: Merlyn Kevin- [phone]   Click here to complete [address] including selection of the Healthcare Decision Maker Relationship (ie \"Primary\")                             Transition of Care Plan:  Discharge planning is aimed at transition to home. Pt lives at home with a friend and doesn't use any DME. Discharge planning with patient education and Medication Reconciliation. Pt will need to be assigned a PCP prior to discharge. Nurse will make follow-up appointments prior to discharge. Patient will be returning back home at discharge.

## 2022-07-23 NOTE — PROGRESS NOTES
Problem: Pain  Goal: *Control of Pain  Outcome: Progressing Towards Goal     Problem: Falls - Risk of  Goal: *Absence of Falls  Description: Document Gerard Fall Risk and appropriate interventions in the flowsheet.   Outcome: Progressing Towards Goal  Note: Fall Risk Interventions:            Medication Interventions: Evaluate medications/consider consulting pharmacy

## 2022-07-23 NOTE — PROGRESS NOTES
[id number]- shift report received  0722- pt assessed, A&O x4, vss, iv patent, receiving ib fluids and ABXs. Patient in no distress, call light in reach. 0900- pt resting  1000-pt sleeping  1020-surgery md present, new order to clear po fluids  1200- pt in bed watching tv      1650- MS given for c/o of HA not relieved by tylenol   1717-pt states HA if now relieved, pt continues to denies ABD pain, pt tolerating po fluids.

## 2022-07-24 VITALS
BODY MASS INDEX: 29.51 KG/M2 | DIASTOLIC BLOOD PRESSURE: 72 MMHG | HEART RATE: 66 BPM | RESPIRATION RATE: 18 BRPM | HEIGHT: 66 IN | WEIGHT: 183.6 LBS | OXYGEN SATURATION: 98 % | TEMPERATURE: 97.2 F | SYSTOLIC BLOOD PRESSURE: 146 MMHG

## 2022-07-24 LAB
ALBUMIN SERPL-MCNC: 3 G/DL (ref 3.4–5)
ALBUMIN/GLOB SERPL: 0.9 {RATIO} (ref 0.8–1.7)
ALP SERPL-CCNC: 41 U/L (ref 45–117)
ALT SERPL-CCNC: 39 U/L (ref 13–56)
ANION GAP SERPL CALC-SCNC: 3 MMOL/L (ref 3–18)
AST SERPL W P-5'-P-CCNC: 18 U/L (ref 10–38)
BASOPHILS # BLD: 0 K/UL (ref 0–0.1)
BASOPHILS NFR BLD: 1 % (ref 0–2)
BILIRUB DIRECT SERPL-MCNC: 0.2 MG/DL (ref 0–0.2)
BILIRUB SERPL-MCNC: 0.6 MG/DL (ref 0.2–1)
BUN SERPL-MCNC: 5 MG/DL (ref 7–18)
BUN/CREAT SERPL: 7 (ref 12–20)
CA-I BLD-MCNC: 8.8 MG/DL (ref 8.5–10.1)
CHLORIDE SERPL-SCNC: 106 MMOL/L (ref 100–111)
CO2 SERPL-SCNC: 28 MMOL/L (ref 21–32)
CREAT SERPL-MCNC: 0.76 MG/DL (ref 0.6–1.3)
DIFFERENTIAL METHOD BLD: ABNORMAL
EOSINOPHIL # BLD: 0.3 K/UL (ref 0–0.4)
EOSINOPHIL NFR BLD: 6 % (ref 0–5)
ERYTHROCYTE [DISTWIDTH] IN BLOOD BY AUTOMATED COUNT: 15 % (ref 11.6–14.5)
GLOBULIN SER CALC-MCNC: 3.4 G/DL (ref 2–4)
GLUCOSE SERPL-MCNC: 101 MG/DL (ref 74–99)
HCT VFR BLD AUTO: 33.5 % (ref 35–45)
HGB BLD-MCNC: 10.6 G/DL (ref 12–16)
IMM GRANULOCYTES # BLD AUTO: 0 K/UL (ref 0–0.04)
IMM GRANULOCYTES NFR BLD AUTO: 0 % (ref 0–0.5)
LYMPHOCYTES # BLD: 1.6 K/UL (ref 0.9–3.6)
LYMPHOCYTES NFR BLD: 35 % (ref 21–52)
MAGNESIUM SERPL-MCNC: 2.2 MG/DL (ref 1.6–2.6)
MCH RBC QN AUTO: 25.9 PG (ref 24–34)
MCHC RBC AUTO-ENTMCNC: 31.6 G/DL (ref 31–37)
MCV RBC AUTO: 81.7 FL (ref 78–100)
MONOCYTES # BLD: 0.4 K/UL (ref 0.05–1.2)
MONOCYTES NFR BLD: 9 % (ref 3–10)
NEUTS SEG # BLD: 2.3 K/UL (ref 1.8–8)
NEUTS SEG NFR BLD: 49 % (ref 40–73)
NRBC # BLD: 0 K/UL (ref 0–0.01)
NRBC BLD-RTO: 0 PER 100 WBC
PLATELET # BLD AUTO: 178 K/UL (ref 135–420)
PMV BLD AUTO: 11.5 FL (ref 9.2–11.8)
POTASSIUM SERPL-SCNC: 3.7 MMOL/L (ref 3.5–5.5)
PROT SERPL-MCNC: 6.4 G/DL (ref 6.4–8.2)
RBC # BLD AUTO: 4.1 M/UL (ref 4.2–5.3)
SODIUM SERPL-SCNC: 137 MMOL/L (ref 136–145)
WBC # BLD AUTO: 4.7 K/UL (ref 4.6–13.2)

## 2022-07-24 PROCEDURE — 83735 ASSAY OF MAGNESIUM: CPT

## 2022-07-24 PROCEDURE — 82248 BILIRUBIN DIRECT: CPT

## 2022-07-24 PROCEDURE — 36415 COLL VENOUS BLD VENIPUNCTURE: CPT

## 2022-07-24 PROCEDURE — 74011250636 HC RX REV CODE- 250/636: Performed by: NURSE PRACTITIONER

## 2022-07-24 PROCEDURE — 74011000250 HC RX REV CODE- 250: Performed by: NURSE PRACTITIONER

## 2022-07-24 PROCEDURE — 74011000258 HC RX REV CODE- 258: Performed by: NURSE PRACTITIONER

## 2022-07-24 PROCEDURE — G0378 HOSPITAL OBSERVATION PER HR: HCPCS

## 2022-07-24 PROCEDURE — 85025 COMPLETE CBC W/AUTO DIFF WBC: CPT

## 2022-07-24 PROCEDURE — 96376 TX/PRO/DX INJ SAME DRUG ADON: CPT

## 2022-07-24 PROCEDURE — 80053 COMPREHEN METABOLIC PANEL: CPT

## 2022-07-24 RX ORDER — PANTOPRAZOLE SODIUM 40 MG/1
40 TABLET, DELAYED RELEASE ORAL DAILY
Status: DISCONTINUED | OUTPATIENT
Start: 2022-07-24 | End: 2022-07-24 | Stop reason: HOSPADM

## 2022-07-24 RX ORDER — CIPROFLOXACIN 500 MG/1
500 TABLET ORAL 2 TIMES DAILY
Qty: 14 TABLET | Refills: 0 | Status: SHIPPED | OUTPATIENT
Start: 2022-07-24 | End: 2022-07-31

## 2022-07-24 RX ADMIN — PIPERACILLIN AND TAZOBACTAM 3.38 G: 3; .375 INJECTION, POWDER, FOR SOLUTION INTRAVENOUS at 04:06

## 2022-07-24 RX ADMIN — SODIUM CHLORIDE, PRESERVATIVE FREE 10 ML: 5 INJECTION INTRAVENOUS at 05:04

## 2022-07-24 NOTE — DISCHARGE SUMMARY
Discharge Summary       PATIENT ID: Blessing Lo  MRN: [id number]   YOB: 1995    DATE OF ADMISSION: 7/22/2022 10:05 AM    DATE OF DISCHARGE: 07/24/22    PRIMARY CARE PROVIDER: None     ATTENDING PHYSICIAN: Jose Villanueva MD  DISCHARGING PROVIDER: Jose Villanueva MD        CONSULTATIONS: IP CONSULT TO GENERAL SURGERY    PROCEDURES/SURGERIES: * No surgery found *    ADMITTING [address] [place] [address] COURSE:      Blessing Lo is a 27 y.o. female with no past medical history presents to the ED with a chief complaint of abdominal pain. Patient reports that the abdominal pain has been ongoing for 2 years, but worsened over the last 3 days. The pain is located to the right upper quadrant, sharp shooting, continuous, radiates to the right lower back, eating worsens pain, and rates pain 7/10, other accompanying symptoms includes nausea and vomiting. Patient denies chest pain, shortness of breath, diarrhea, constipation, fever, and chills. While in the ED CBC unremarkable, BMP unremarkable, and CT abdomen shown CT abdomen: Cholelithiasis, nonmobile stone in gallbladder neck, and sonographic findings which can be seen with acute cholecystitis. Enlarged common duct, without obstructing lesion seen. Clinical and laboratory correlation could best evaluate for biliary obstruction. General surgery consulted in the ED for acute cholecystitis, with plans to keep NPO, start IV fluids, IV antibiotics, and pain management, patient will be seen by general surgery in the am.     Admit to medical surgery unit. History reviewed. No pertinent past medical history. DISCHARGE DIAGNOSES / PLAN:      Assessment & Plan:      Acute cholecystitis  -CT abdomen: Cholelithiasis, nonmobile stone in gallbladder neck, and sonographic findings which can be seen with acute cholecystitis. Enlarged common duct, without obstructing lesion seen. Clinical and laboratory correlation could best evaluate for biliary obstruction. -cont ivf, start clears, pain greatly improved  -monitor electrolytes  -supportive care and pain mgt (Morphine)  -antibiotics (IV Zosyn)  -general surgery consulted- iv abx one more day, then if pain resolved, no fevers, ok for homw with po cipro for 1 week, fu dr Stefany Mcginnis, plan for salvage lap gilbert in 6 weeks  -repeat CMP and CBC in the am     Nicotine Abuse  -patient uses vape pen, offered Nicotine patch, patient decline  -cessation education provided        Day of dc  Discussed with surgery yesterday, if feeling well, which she does, no complaints, she can go home with 1 week abx and see Dr iMngo Caputo in [address] [facility] [place], [address] set up salvage lap gilbert in 6-8 weeks. FOLLOW UP APPOINTMENTS:    Follow-up Information       Follow up With Specialties Details Why Contact Info    None    None ([phone]) Patient stated that they have no PCP      Shaunna Bryan MD General Surgery Follow up in 1 week(s)  [address] [address] [id number] [phone]                 DIET: Low fat, Low cholesterol        DISCHARGE MEDICATIONS:  Current Discharge Medication List        START taking these medications    Details   ciprofloxacin HCl (CIPRO) 500 mg tablet Take 1 Tablet by mouth two (2) times a day for 7 days. Qty: 14 Tablet, Refills: 0  Start date: 7/24/2022, End date: 7/31/2022           CONTINUE these medications which have NOT CHANGED    Details   norgestimate-ethinyl estradioL (ORTHO-CYCLEN, SPRINTEC) 0.25-35 mg-mcg tab Take 1 Tablet by mouth in the morning. NOTIFY YOUR PHYSICIAN FOR ANY OF THE FOLLOWING:   Fever over 101 degrees for 24 hours. Chest pain, shortness of breath, fever, chills, nausea, vomiting, diarrhea, change in mentation, falling, weakness, bleeding. Severe pain or pain not relieved by medications. Or, any other signs or symptoms that you may have questions about.     DISPOSITION:home     Home With:   OT  PT  HH  RN       Long term SNF/Inpatient Rehab    Independent/assisted living    Hospice Other: PATIENT CONDITION AT DISCHARGE:     Functional status    Poor     Deconditioned    x Independent      Cognition    x Lucid     Forgetful     Dementia      Catheters/lines (plus indication)    Valdez     PICC     PEG    x None      Code status   x  Full code     DNR      PHYSICAL EXAMINATION AT DISCHARGE:  General:          Alert, cooperative, no distress, appears stated age. HEENT:           Atraumatic, anicteric sclerae, pink conjunctivae                          No oral ulcers, mucosa moist, throat clear, dentition fair  Neck:               Supple, symmetrical  Lungs:             Clear to auscultation bilaterally. No Wheezing or Rhonchi. No rales. Chest wall:      No tenderness  No Accessory muscle use. Heart:              Regular  rhythm,  No  murmur   No edema  Abdomen:        Soft, non-tender. Not distended. Bowel sounds normal  Extremities:     No cyanosis. No clubbing,                            Skin turgor normal, Capillary refill normal  Skin:                Not pale. Not Jaundiced  No rashes   Psych:             Not anxious or agitated.   Neurologic:      Alert, moves all extremities, answers questions appropriately and responds to commands       CHRONIC MEDICAL DIAGNOSES:  Problem List as of 7/24/2022 Never Reviewed            Codes Class Noted - Resolved    Acute cholecystitis ICD-10-CM: K81.0  ICD-9-CM: 575.0  7/22/2022 - Present           Greater than 35 minutes were spent with the patient on counseling and coordination of care    Signed:   Best Steinberg MD  7/24/2022  8:38 AM

## 2022-07-24 NOTE — PROGRESS NOTES
1900 - Bedside shift report received from   [facility]. 1915 - Vital signs assessed. Patient provided with hygiene supplies and set up for shower. 1936 - Patient back in bed. Zosyn infusing. Call light in reach. 2310 - New bag continuous fluids infusing. Provider notified of patient request for solid food. Orders to advance diet as tolerated. Patient provided with cup of applesauce. 0406 - Vital signs assessed. PIV access reinforced with tape. Scheduled antibiotic infusing.

## 2022-07-24 NOTE — PROGRESS NOTES
Progress Note  Date:2022       Room:[facility]  Patient Mireya Counts     YOB: 1995     Age:26 y.o. female hospital day #2 for delayed presentation cholecystitis secondary to cholelithiasis who states that she has been without pain and no analgesia over the last 24 hours. She is tolerating her clear liquid diet. She denies any recurrence of preadmission symptoms. Subjective    Subjective   Review of Systems   All other systems reviewed and are negative. Objective         Vitals Last 24 Hours:  TEMPERATURE:  Temp  Av.2 °F (36.2 °C)  Min: 96.9 °F (36.1 °C)  Max: 97.3 °F (36.3 °C)  RESPIRATIONS RANGE: Resp  Av  Min: 18  Max: 18  PULSE OXIMETRY RANGE: SpO2  Av %  Min: 97 %  Max: 99 %  PULSE RANGE: Pulse  Av.8  Min: 66  Max: 80  BLOOD PRESSURE RANGE: Systolic (43APG), OSB:399 , Min:123 , SCD:895   ; Diastolic (53DIN), MHV:35, Min:52, Max:88    I/O (24Hr): Intake/Output Summary (Last 24 hours) at 2022 1012  Last data filed at 2022 1725  Gross per 24 hour   Intake 380 ml   Output --   Net 380 ml     Objective  Labs/Imaging/Diagnostics    Labs:  CBC:  Recent Labs     22  0445 22  0500 22  1017   WBC 4.7 4.4* 5.8   RBC 4.10* 3.85* 4.54   HGB 10.6* 10.1* 11.8*   HCT 33.5* 31.9* 37.7   MCV 81.7 82.9 83.0   RDW 15.0* 15.4* 15.9*    148 220     CHEMISTRIES:  Recent Labs     22  0445 22  0500 22  1017    137 140   K 3.7 3.8 3.6    106 106   CO2 28 27 26   BUN 5* 8 14   CA 8.8 8.2* 9.2   MG 2.2 2.0 2.0   PT/INR:No results for input(s): INR, INREXT in the last 72 hours. No lab exists for component: PROTIME  APTT:No results for input(s): APTT in the last 72 hours.   LIVER PROFILE:  Recent Labs     22  0445 22  0500 22  1017   AST 18 21 26   ALT 39 35 43     Lab Results   Component Value Date/Time    ALT (SGPT) 39 2022 04:45 AM    AST (SGOT) 18 2022 04:45 AM    Alk. phosphatase 41 (L) 07/24/2022 04:45 AM    Bilirubin, direct 0.2 07/24/2022 04:45 AM    Bilirubin, total 0.6 07/24/2022 04:45 AM       Imaging Last 24 Hours:  No results found. Assessment//Plan   Active Problems:    Acute cholecystitis (7/22/2022)      Assessment & Plan 5year-old female with resolution of cholecystitis secondary to cholelithiasis in the clinical setting of prolonged delayed presentation who is responded well to current treatment regimen. Patient will be discharged on 7 days of oral antibiotics and follow-up with Dr. Stef Barth this week for evaluation and treatment planning of a delayed laparoscopic cholecystectomy on an outpatient basis.     Electronically signed by Gordo Sanabria MD on 7/24/2022 at 10:12 AM

## 2022-07-25 ENCOUNTER — APPOINTMENT (OUTPATIENT)
Dept: CT IMAGING | Age: 27
End: 2022-07-25
Attending: INTERNAL MEDICINE
Payer: MEDICAID

## 2022-07-25 ENCOUNTER — HOSPITAL ENCOUNTER (OUTPATIENT)
Age: 27
Setting detail: OBSERVATION
Discharge: HOME | End: 2022-07-27
Attending: FAMILY MEDICINE | Admitting: INTERNAL MEDICINE
Payer: MEDICAID

## 2022-07-25 DIAGNOSIS — K80.00 ACUTE CHOLECYSTITIS DUE TO BILIARY CALCULUS: Primary | ICD-10-CM

## 2022-07-25 LAB
ALBUMIN SERPL-MCNC: 3.6 G/DL (ref 3.4–5)
ALBUMIN/GLOB SERPL: 0.9 {RATIO} (ref 0.8–1.7)
ALP SERPL-CCNC: 58 U/L (ref 45–117)
ALT SERPL-CCNC: 37 U/L (ref 13–56)
ANION GAP SERPL CALC-SCNC: 7 MMOL/L (ref 3–18)
APPEARANCE UR: CLEAR
AST SERPL W P-5'-P-CCNC: 17 U/L (ref 10–38)
BASOPHILS # BLD: 0 K/UL (ref 0–0.1)
BASOPHILS NFR BLD: 1 % (ref 0–2)
BILIRUB SERPL-MCNC: 0.3 MG/DL (ref 0.2–1)
BILIRUB UR QL: NEGATIVE
BUN SERPL-MCNC: 13 MG/DL (ref 7–18)
BUN/CREAT SERPL: 15 (ref 12–20)
CA-I BLD-MCNC: 9.3 MG/DL (ref 8.5–10.1)
CHLORIDE SERPL-SCNC: 108 MMOL/L (ref 100–111)
CO2 SERPL-SCNC: 24 MMOL/L (ref 21–32)
COLOR UR: YELLOW
CREAT SERPL-MCNC: 0.86 MG/DL (ref 0.6–1.3)
DIFFERENTIAL METHOD BLD: ABNORMAL
EOSINOPHIL # BLD: 0.3 K/UL (ref 0–0.4)
EOSINOPHIL NFR BLD: 5 % (ref 0–5)
ERYTHROCYTE [DISTWIDTH] IN BLOOD BY AUTOMATED COUNT: 15.3 % (ref 11.6–14.5)
GLOBULIN SER CALC-MCNC: 4 G/DL (ref 2–4)
GLUCOSE SERPL-MCNC: 109 MG/DL (ref 74–99)
GLUCOSE UR STRIP.AUTO-MCNC: NEGATIVE MG/DL
HCG UR QL: NEGATIVE
HCT VFR BLD AUTO: 37.1 % (ref 35–45)
HGB BLD-MCNC: 11.9 G/DL (ref 12–16)
HGB UR QL STRIP: NEGATIVE
IMM GRANULOCYTES # BLD AUTO: 0 K/UL (ref 0–0.04)
IMM GRANULOCYTES NFR BLD AUTO: 0 % (ref 0–0.5)
KETONES UR QL STRIP.AUTO: NEGATIVE MG/DL
LEUKOCYTE ESTERASE UR QL STRIP.AUTO: NEGATIVE
LIPASE SERPL-CCNC: 111 U/L (ref 73–393)
LYMPHOCYTES # BLD: 1.6 K/UL (ref 0.9–3.6)
LYMPHOCYTES NFR BLD: 33 % (ref 21–52)
MCH RBC QN AUTO: 26 PG (ref 24–34)
MCHC RBC AUTO-ENTMCNC: 32.1 G/DL (ref 31–37)
MCV RBC AUTO: 81 FL (ref 78–100)
MONOCYTES # BLD: 0.4 K/UL (ref 0.05–1.2)
MONOCYTES NFR BLD: 8 % (ref 3–10)
NEUTS SEG # BLD: 2.6 K/UL (ref 1.8–8)
NEUTS SEG NFR BLD: 53 % (ref 40–73)
NITRITE UR QL STRIP.AUTO: NEGATIVE
NRBC # BLD: 0 K/UL (ref 0–0.01)
NRBC BLD-RTO: 0 PER 100 WBC
PH UR STRIP: 6 [PH] (ref 5–8)
PLATELET # BLD AUTO: 222 K/UL (ref 135–420)
PMV BLD AUTO: 11 FL (ref 9.2–11.8)
POTASSIUM SERPL-SCNC: 3.7 MMOL/L (ref 3.5–5.5)
PROT SERPL-MCNC: 7.6 G/DL (ref 6.4–8.2)
PROT UR STRIP-MCNC: NEGATIVE MG/DL
RBC # BLD AUTO: 4.58 M/UL (ref 4.2–5.3)
SODIUM SERPL-SCNC: 139 MMOL/L (ref 136–145)
SP GR UR REFRACTOMETRY: 1.03 (ref 1–1.03)
UROBILINOGEN UR QL STRIP.AUTO: 1 EU/DL (ref 0.2–1)
WBC # BLD AUTO: 5 K/UL (ref 4.6–13.2)

## 2022-07-25 PROCEDURE — 74011250636 HC RX REV CODE- 250/636: Performed by: FAMILY MEDICINE

## 2022-07-25 PROCEDURE — 74011250636 HC RX REV CODE- 250/636: Performed by: NURSE PRACTITIONER

## 2022-07-25 PROCEDURE — 99281 EMR DPT VST MAYX REQ PHY/QHP: CPT | Performed by: SURGERY

## 2022-07-25 PROCEDURE — 99221 1ST HOSP IP/OBS SF/LOW 40: CPT | Performed by: SURGERY

## 2022-07-25 PROCEDURE — 99285 EMERGENCY DEPT VISIT HI MDM: CPT

## 2022-07-25 PROCEDURE — 81025 URINE PREGNANCY TEST: CPT

## 2022-07-25 PROCEDURE — 83690 ASSAY OF LIPASE: CPT

## 2022-07-25 PROCEDURE — 85025 COMPLETE CBC W/AUTO DIFF WBC: CPT

## 2022-07-25 PROCEDURE — 96376 TX/PRO/DX INJ SAME DRUG ADON: CPT

## 2022-07-25 PROCEDURE — 74011000258 HC RX REV CODE- 258: Performed by: INTERNAL MEDICINE

## 2022-07-25 PROCEDURE — 74011250636 HC RX REV CODE- 250/636: Performed by: INTERNAL MEDICINE

## 2022-07-25 PROCEDURE — 74011000258 HC RX REV CODE- 258: Performed by: NURSE PRACTITIONER

## 2022-07-25 PROCEDURE — 96375 TX/PRO/DX INJ NEW DRUG ADDON: CPT

## 2022-07-25 PROCEDURE — 81003 URINALYSIS AUTO W/O SCOPE: CPT

## 2022-07-25 PROCEDURE — G0378 HOSPITAL OBSERVATION PER HR: HCPCS

## 2022-07-25 PROCEDURE — 74176 CT ABD & PELVIS W/O CONTRAST: CPT

## 2022-07-25 PROCEDURE — 36415 COLL VENOUS BLD VENIPUNCTURE: CPT

## 2022-07-25 PROCEDURE — 80053 COMPREHEN METABOLIC PANEL: CPT

## 2022-07-25 PROCEDURE — 74011000250 HC RX REV CODE- 250: Performed by: NURSE PRACTITIONER

## 2022-07-25 PROCEDURE — 96365 THER/PROPH/DIAG IV INF INIT: CPT

## 2022-07-25 PROCEDURE — 65270000029 HC RM PRIVATE

## 2022-07-25 RX ORDER — ACETAMINOPHEN 650 MG/1
650 SUPPOSITORY RECTAL
Status: DISCONTINUED | OUTPATIENT
Start: 2022-07-25 | End: 2022-07-27 | Stop reason: HOSPADM

## 2022-07-25 RX ORDER — POLYETHYLENE GLYCOL 3350 17 G/17G
17 POWDER, FOR SOLUTION ORAL DAILY PRN
Status: DISCONTINUED | OUTPATIENT
Start: 2022-07-25 | End: 2022-07-27 | Stop reason: HOSPADM

## 2022-07-25 RX ORDER — SODIUM CHLORIDE 9 MG/ML
75 INJECTION, SOLUTION INTRAVENOUS CONTINUOUS
Status: DISCONTINUED | OUTPATIENT
Start: 2022-07-25 | End: 2022-07-27 | Stop reason: HOSPADM

## 2022-07-25 RX ORDER — SODIUM CHLORIDE 0.9 % (FLUSH) 0.9 %
5-40 SYRINGE (ML) INJECTION AS NEEDED
Status: DISCONTINUED | OUTPATIENT
Start: 2022-07-25 | End: 2022-07-27 | Stop reason: HOSPADM

## 2022-07-25 RX ORDER — ONDANSETRON 2 MG/ML
4 INJECTION INTRAMUSCULAR; INTRAVENOUS ONCE
Status: COMPLETED | OUTPATIENT
Start: 2022-07-25 | End: 2022-07-25

## 2022-07-25 RX ORDER — MORPHINE SULFATE 2 MG/ML
2 INJECTION, SOLUTION INTRAMUSCULAR; INTRAVENOUS
Status: COMPLETED | OUTPATIENT
Start: 2022-07-25 | End: 2022-07-25

## 2022-07-25 RX ORDER — SODIUM CHLORIDE 0.9 % (FLUSH) 0.9 %
5-40 SYRINGE (ML) INJECTION EVERY 8 HOURS
Status: DISCONTINUED | OUTPATIENT
Start: 2022-07-25 | End: 2022-07-27 | Stop reason: HOSPADM

## 2022-07-25 RX ORDER — ONDANSETRON 4 MG/1
4 TABLET, ORALLY DISINTEGRATING ORAL
Status: DISCONTINUED | OUTPATIENT
Start: 2022-07-25 | End: 2022-07-27 | Stop reason: HOSPADM

## 2022-07-25 RX ORDER — ACETAMINOPHEN 325 MG/1
650 TABLET ORAL
Status: DISCONTINUED | OUTPATIENT
Start: 2022-07-25 | End: 2022-07-27 | Stop reason: HOSPADM

## 2022-07-25 RX ORDER — ONDANSETRON 2 MG/ML
4 INJECTION INTRAMUSCULAR; INTRAVENOUS
Status: DISCONTINUED | OUTPATIENT
Start: 2022-07-25 | End: 2022-07-27 | Stop reason: HOSPADM

## 2022-07-25 RX ORDER — OXYCODONE AND ACETAMINOPHEN 5; 325 MG/1; MG/1
2 TABLET ORAL
Status: DISCONTINUED | OUTPATIENT
Start: 2022-07-25 | End: 2022-07-27 | Stop reason: HOSPADM

## 2022-07-25 RX ORDER — KETOROLAC TROMETHAMINE 30 MG/ML
30 INJECTION, SOLUTION INTRAMUSCULAR; INTRAVENOUS
Status: DISCONTINUED | OUTPATIENT
Start: 2022-07-25 | End: 2022-07-27 | Stop reason: HOSPADM

## 2022-07-25 RX ORDER — IBUPROFEN 200 MG
200 CAPSULE ORAL AS NEEDED
COMMUNITY
End: 2022-10-03

## 2022-07-25 RX ADMIN — SODIUM CHLORIDE 100 ML/HR: 9 INJECTION, SOLUTION INTRAVENOUS at 23:41

## 2022-07-25 RX ADMIN — KETOROLAC TROMETHAMINE 30 MG: 30 INJECTION, SOLUTION INTRAMUSCULAR; INTRAVENOUS at 15:00

## 2022-07-25 RX ADMIN — PIPERACILLIN AND TAZOBACTAM 3.38 G: 3; .375 INJECTION, POWDER, FOR SOLUTION INTRAVENOUS at 07:52

## 2022-07-25 RX ADMIN — MORPHINE SULFATE 2 MG: 2 INJECTION, SOLUTION INTRAMUSCULAR; INTRAVENOUS at 06:56

## 2022-07-25 RX ADMIN — ONDANSETRON 4 MG: 2 INJECTION INTRAMUSCULAR; INTRAVENOUS at 06:55

## 2022-07-25 RX ADMIN — PIPERACILLIN AND TAZOBACTAM 3.38 G: 3; .375 INJECTION, POWDER, FOR SOLUTION INTRAVENOUS at 15:01

## 2022-07-25 RX ADMIN — KETOROLAC TROMETHAMINE 30 MG: 30 INJECTION, SOLUTION INTRAMUSCULAR; INTRAVENOUS at 21:02

## 2022-07-25 RX ADMIN — SODIUM CHLORIDE 100 ML/HR: 9 INJECTION, SOLUTION INTRAVENOUS at 15:01

## 2022-07-25 RX ADMIN — PIPERACILLIN AND TAZOBACTAM 3.38 G: 3; .375 INJECTION, POWDER, FOR SOLUTION INTRAVENOUS at 23:38

## 2022-07-25 RX ADMIN — ONDANSETRON 4 MG: 2 INJECTION INTRAMUSCULAR; INTRAVENOUS at 21:07

## 2022-07-25 RX ADMIN — SODIUM CHLORIDE, PRESERVATIVE FREE 10 ML: 5 INJECTION INTRAVENOUS at 21:22

## 2022-07-25 NOTE — CONSULTS
Surgery consult:    CC: Abdominal pain    HPI:   The patient is a 28 y/o female recently admitted with a 4 day history of RUQ abdominal pain. U.S. at that time showed a thickened GB all with a stone in the neck of the GB. CBD slightly dilated at 7 mm but LFTs wnl and no history of jaundice. Was admitted for antibiotic therapy in hopes of delaying cholecystectomy for 6 to 8 weeks to improve chances for a ;laparoscopic procedure. Patient was started on Zosyn, made NPO and symptoms resolved quickly. She was discharged home yesterday on a low fatdiet and p.o. antibiotics. She ate a cheeseburger last p.m. and awoke this A. M. with recurrent pain, similar to what she had prior to her previous admission. She denies fever, chills, jaundice, neda colored stools or dark urine. We are asked to evaluate again. PMHx:  S/p C section  Hx of back surgery    Family history:  No pertinent family history    Shx:  Occasional smoker, 1/4 ppd. No etoh use  No illicit drugs  Meds:  Cipro since discharge  IV Zosyn in E.R. this A.M. Allergies: NKDA    Systems review:  All other systems reviewed and negative. On Exam:  Uncomfortable 28 y/o female. VS: B.P 138/87         Pulse78         Resp 20         Temp 97.7  HEENT: NC/AT, sclera not icteric  Neck: supple without masses  Chest: Lungs clear  Heart: Regular rate and rhythm  Abdomen: marked tenderness and fullness RUQ, rest of abdomen is soft and not tender  Rectal: deferred  Pelvic:deferred. Ext: no C/C/E  Neuro: grossly intact    Lab  WBC 5,000 without a left shift  Lytes and LFTs wnl    CT abdomen: official report not yet available. To my read, marked GB wall thickening, no free fluid or air in the wall. Multiple radiopaque stone with one in region of the neck of the GB. Impression:  Persistent acute cholecystitis, now at least 8 days into process.   Options are admit and make npo with IV antibiotic and serial exam and refer for percutaneous drainage of the GB if doesn't improve VS refer now.   Will discuss with patient and hospitalist.

## 2022-07-25 NOTE — ED NOTES
CT reviewed by Dr Jalil Brians. States to admit to hospitalist for antibiotics and he will follow. Discussed with Dr Andrew Alston who agrees to admit.

## 2022-07-25 NOTE — ED PROVIDER NOTES
Patient presents to the ED for RUQ pain. She was discharged from the hospital yesterday after a 2 day stay for cholecystitis. She received IV antibiotics and was to follow up as an outpatient with General Surgery. She reports upon discharge she was tolerating po and pain free. She woke up this morning in pain. Last po intake was supper last night. She reports 1 episode of vomiting this morning (food particles). She states this pain is the same as her initial pain when she was admitted to the ED. No fever, chills, chest pain, SOB, black/bloody content to stool/vomit, urinary symptoms, dizziness or syncope. She was due to call surgery today for follow up. She was discharged with oral antibiotics, which she is taking. She was not discharge with pain/nausea medication       Past Medical History:   Diagnosis Date    Cholecystitis     Migraines        Past Surgical History:   Procedure Laterality Date    HX BACK SURGERY      Back surgery for scoliosis    HX  SECTION           No family history on file. Social History     Socioeconomic History    Marital status: SINGLE     Spouse name: Not on file    Number of children: Not on file    Years of education: Not on file    Highest education level: Not on file   Occupational History    Not on file   Tobacco Use    Smoking status: Some Days     Packs/day: 0.25     Types: Cigarettes    Smokeless tobacco: Never   Substance and Sexual Activity    Alcohol use: Never    Drug use: Yes     Types: Marijuana    Sexual activity: Yes   Other Topics Concern    Not on file   Social History Narrative    Not on file     Social Determinants of Health     Financial Resource Strain: Not on file   Food Insecurity: Not on file   Transportation Needs: Not on file   Physical Activity: Not on file   Stress: Not on file   Social Connections: Not on file   Intimate Partner Violence: Not on file   Housing Stability: Not on file         ALLERGIES: Patient has no known allergies.     Review of Systems   Constitutional: Negative. HENT: Negative. Respiratory: Negative. Cardiovascular: Negative. Gastrointestinal:  Positive for abdominal pain and vomiting. Genitourinary: Negative. Neurological: Negative. All other systems reviewed and are negative. Vitals:    07/25/22 0623   BP: 138/87   Pulse: 78   Resp: 20   Temp: 97.7 °F (36.5 °C)   SpO2: 100%   Weight: 82.7 kg (182 lb 4.8 oz)   Height: 5' 6\" (1.676 m)            Physical Exam  Vitals and nursing note reviewed. Constitutional:       General: She is not in acute distress. Appearance: She is well-developed and normal weight. She is not ill-appearing, toxic-appearing or diaphoretic. HENT:      Head: Normocephalic and atraumatic. Mouth/Throat:      Mouth: Mucous membranes are moist.   Eyes:      General: No scleral icterus. Extraocular Movements: Extraocular movements intact. Cardiovascular:      Rate and Rhythm: Normal rate and regular rhythm. Heart sounds: Normal heart sounds. Pulmonary:      Effort: Pulmonary effort is normal. No respiratory distress. Breath sounds: Normal breath sounds. Abdominal:      General: Abdomen is flat. Bowel sounds are normal.      Palpations: Abdomen is soft. Tenderness: There is abdominal tenderness in the right upper quadrant. There is guarding. Negative signs include McBurney's sign. Skin:     General: Skin is warm and dry. Neurological:      General: No focal deficit present. Mental Status: She is alert and oriented to person, place, and time. MDM  Risk of Complications, Morbidity, and/or Mortality  General comments: Sign out given to Dr. Ada Bender.  Awaiting patient labs and anticipate Gen Surg consult           Procedures

## 2022-07-25 NOTE — ASSESSMENT & PLAN NOTE
-General surgery following. Plan for percutaneous biliary drain if patient does not improve vs now.   -NPO with IV fluids  -WBC 5000, LFTs WNL  -Repeat CBC and CMP in AM   -IV Zosyn Q8h  -PRN analgesics for pain  -PRN zofran for nausea

## 2022-07-25 NOTE — ED NOTES
8:45 AM  Pt seen in ER by Dr Kami Sales; states to do CT of the abdomen and he will review to see if pt can have surgery here.

## 2022-07-25 NOTE — H&P
History and Physical    Subjective:     Shaina Boland is a 27 y.o. female  has a past medical history of Cholecystitis and Migraines. Patient admitted recently discharged  after being admitted for cholecystitis. She reports having sharp intermittent abdominal pain for approximatley 2 years but worsened 3 days prior to coming to ED . Patient was admitted and treated with IV abx and discharged home on PO abx and low fat diet. Plan was to follow-up with general surgery for possible cholecystectomy in 6-8 weeks. Patient stated she ate a cheeseburger last night for dinner and was awaken this morning with severe RUQ pain similar to pain prior to last admission. CT scan done in ED, showed continued chololithiasis. C/o nausea and vomiting this am which has since resolved. Denies SOB, chest pain, fever, chills. C/o RUQ abdominal pain 5/10. Pain is sharp in nature and radiates to right mid back at times. Pain is worst with movement. Improved with medications and rest.      Admit patient to medical surgical unit. Seen by general surgery in ED. Recommended to admit and make NPO. Treat with IV abx. Possible percutaneous drain placement. Patient agrees with plan. Past Medical History:   Diagnosis Date    Cholecystitis     Migraines       Past Surgical History:   Procedure Laterality Date    HX BACK SURGERY      Back surgery for scoliosis    HX  SECTION       No family history on file. Social History     Tobacco Use    Smoking status: Some Days     Packs/day: 0.25     Types: Cigarettes    Smokeless tobacco: Never   Substance Use Topics    Alcohol use: Never       Prior to Admission medications    Medication Sig Start Date End Date Taking? Authorizing Provider   ibuprofen 200 mg cap Take 200 mg by mouth as needed for Pain. Yes Other, MD Jono   ciprofloxacin HCl (CIPRO) 500 mg tablet Take 1 Tablet by mouth two (2) times a day for 7 days.  22 Yes Lilly Burns MD norgestimate-ethinyl estradioL (ORTHO-CYCLEN, SPRINTEC) 0.25-35 mg-mcg tab Take 1 Tablet by mouth in the morning. Yes Provider, Historical     No Known Allergies      Review of Systems   Gastrointestinal:  Positive for abdominal pain. All other systems reviewed and are negative. Objective:   VITALS:    Visit Vitals  /70   Pulse 60   Temp 97.2 °F (36.2 °C)   Resp 20   Ht 5' 6\" (1.676 m)   Wt 82.7 kg (182 lb 4.8 oz)   SpO2 97%   BMI 29.42 kg/m²       Physical Exam  Constitutional:       Appearance: Normal appearance. She is normal weight. HENT:      Head: Normocephalic and atraumatic. Right Ear: External ear normal.      Left Ear: External ear normal.      Nose: Nose normal.      Mouth/Throat:      Mouth: Mucous membranes are moist.      Pharynx: Oropharynx is clear. Eyes:      Extraocular Movements: Extraocular movements intact. Conjunctiva/sclera: Conjunctivae normal.      Pupils: Pupils are equal, round, and reactive to light. Cardiovascular:      Rate and Rhythm: Normal rate and regular rhythm. Pulses: Normal pulses. Heart sounds: Normal heart sounds. Pulmonary:      Effort: Pulmonary effort is normal.      Breath sounds: Normal breath sounds. Abdominal:      General: Abdomen is flat. Bowel sounds are normal.      Palpations: Abdomen is soft. Musculoskeletal:         General: Normal range of motion. Cervical back: Normal range of motion and neck supple. Skin:     General: Skin is warm and dry. Neurological:      General: No focal deficit present. Mental Status: She is alert and oriented to person, place, and time.    Psychiatric:         Mood and Affect: Mood normal.         Behavior: Behavior normal.       _______________________________________________________________________  Care Plan discussed with:    Comments   Patient X    Family      RN X    Care Manager                    Consultant: _______________________________________________________________________  Expected  Disposition:   Home with Family X   HH/PT/OT/RN    SNF/LTC    BELLA    ________________________________________________________________________  TOTAL TIME:  50 Minutes    Critical Care Provided     Minutes non procedure based      Comments    X Reviewed previous records   >50% of visit spent in counseling and coordination of care X Discussion with patient and/or family and questions answered       ________________________________________________________________________    Labs:  Recent Results (from the past 24 hour(s))   CBC WITH AUTOMATED DIFF    Collection Time: 07/25/22  6:29 AM   Result Value Ref Range    WBC 5.0 4.6 - 13.2 K/uL    RBC 4.58 4.20 - 5.30 M/uL    HGB 11.9 (L) 12.0 - 16.0 g/dL    HCT 37.1 35.0 - 45.0 %    MCV 81.0 78.0 - 100.0 FL    MCH 26.0 24.0 - 34.0 PG    MCHC 32.1 31.0 - 37.0 g/dL    RDW 15.3 (H) 11.6 - 14.5 %    PLATELET 758 561 - 700 K/uL    MPV 11.0 9.2 - 11.8 FL    NRBC 0.0 0.0  WBC    ABSOLUTE NRBC 0.00 0.00 - 0.01 K/uL    NEUTROPHILS 53 40 - 73 %    LYMPHOCYTES 33 21 - 52 %    MONOCYTES 8 3 - 10 %    EOSINOPHILS 5 0 - 5 %    BASOPHILS 1 0 - 2 %    IMMATURE GRANULOCYTES 0 0 - 0.5 %    ABS. NEUTROPHILS 2.6 1.8 - 8.0 K/UL    ABS. LYMPHOCYTES 1.6 0.9 - 3.6 K/UL    ABS. MONOCYTES 0.4 0.05 - 1.2 K/UL    ABS. EOSINOPHILS 0.3 0.0 - 0.4 K/UL    ABS. BASOPHILS 0.0 0.0 - 0.1 K/UL    ABS. IMM.  GRANS. 0.0 0.00 - 0.04 K/UL    DF AUTOMATED     METABOLIC PANEL, COMPREHENSIVE    Collection Time: 07/25/22  6:29 AM   Result Value Ref Range    Sodium 139 136 - 145 mmol/L    Potassium 3.7 3.5 - 5.5 mmol/L    Chloride 108 100 - 111 mmol/L    CO2 24 21 - 32 mmol/L    Anion gap 7 3.0 - 18.0 mmol/L    Glucose 109 (H) 74 - 99 mg/dL    BUN 13 7 - 18 mg/dL    Creatinine 0.86 0.60 - 1.30 mg/dL    BUN/Creatinine ratio 15 12 - 20      GFR est AA >60 >60 ml/min/1.73m2    GFR est non-AA >60 >60 ml/min/1.73m2    Calcium 9.3 8.5 - 10.1 mg/dL    Bilirubin, total 0.3 0.2 - 1.0 mg/dL    AST (SGOT) 17 10 - 38 U/L    ALT (SGPT) 37 13 - 56 U/L    Alk. phosphatase 58 45 - 117 U/L    Protein, total 7.6 6.4 - 8.2 g/dL    Albumin 3.6 3.4 - 5.0 g/dL    Globulin 4.0 2.0 - 4.0 g/dL    A-G Ratio 0.9 0.8 - 1.7     LIPASE    Collection Time: 07/25/22  6:29 AM   Result Value Ref Range    Lipase 111 73 - 393 U/L   URINALYSIS W/ RFLX MICROSCOPIC    Collection Time: 07/25/22  8:30 AM   Result Value Ref Range    Color Yellow      Appearance Clear      Specific gravity 1.026 1.005 - 1.030      pH (UA) 6.0 5.0 - 8.0      Protein Negative Negative mg/dL    Glucose Negative Negative mg/dL    Ketone Negative Negative mg/dL    Bilirubin Negative Negative      Blood Negative Negative      Urobilinogen 1.0 0.2 - 1.0 EU/dL    Nitrites Negative Negative      Leukocyte Esterase Negative Negative     HCG URINE, QL    Collection Time: 07/25/22  8:30 AM   Result Value Ref Range    HCG urine, QL Negative Negative         Imaging:  CT ABD PELV WO CONT    Result Date: 7/25/2022  EXAM: CT of the abdomen and pelvis CLINICAL INDICATION/HISTORY: biliary colic   > Additional: None. COMPARISON: None. > Reference Exam: None. TECHNIQUE: Axial CT imaging of the abdomen and pelvis was performed without intravenous contrast. Multiplanar reformats were generated. One or more dose reduction techniques were used on this CT: automated exposure control, adjustment of the mAs and/or kVp according to patient size, and iterative reconstruction techniques. The specific techniques used on this CT exam have been documented in the patient's electronic medical record. Digital Imaging and Communications in Medicine (DICOM) format image data are available to nonaffiliated external healthcare facilities or entities on a secure, media free, reciprocally searchable basis with patient authorization for at least a 12-month period after this study. _______________ FINDINGS: LOWER CHEST: Unremarkable.  LIVER, BILIARY: Liver is normal. No biliary dilation. Multiple calcified gallstones. PANCREAS: Normal. SPLEEN: Normal. ADRENALS: Normal. KIDNEYS/URETERS/BLADDER: Normal. LYMPH NODES: No enlarged lymph nodes. GASTROINTESTINAL TRACT: No bowel dilation or wall thickening. Normal appendix. PELVIC ORGANS: Probable bilateral ovarian cysts, presumably benign. Uterus is intact. VASCULATURE: Unremarkable. BONES: Streak artifact from thoracolumbar spinal fusion hardware. No acute or aggressive osseous abnormalities identified. OTHER: No ascites. _______________     1. No acute inflammatory process in the abdomen or pelvis. 2. Cholelithiasis. Assessment & Plan:       Acute cholecystitis due to biliary calculus  -General surgery following. Plan for percutaneous biliary drain if patient does not improve vs now. -NPO with IV fluids  -WBC 5000, LFTs WNL  -Repeat CBC and CMP in AM   -IV Zosyn Q8h  -PRN analgesics for pain  -PRN zofran for nausea        Code Status: Full Code    Prophylaxis: SCDs      Electronically Signed :  Armani Laboy. Radha Christie, MSN, [facility]        Dragon voice recognition was used to generate this report, which may have resulted in some phonetic based errors in grammar and contents.  Even though attempts were made to correct all the mistakes, some may have been missed, and remained in the body of the document

## 2022-07-25 NOTE — ED TRIAGE NOTES
Diagnosed with cholecystitis several days ago, discharged from hospital yesterday. Did receive IV antibiotics. Was to call for follow up with surgery today. Does report episode of vomiting.  Has not taken any thing for pain this morning

## 2022-07-25 NOTE — ED NOTES
TRANSFER - OUT REPORT:    Verbal report given to casie (name) on Christine Gaston  being transferred to [employer] (unit) for routine progression of care       Report consisted of patients Situation, Background, Assessment and   Recommendations(SBAR). Information from the following report(s) ED Summary was reviewed with the receiving nurse. Lines:   Peripheral IV 07/25/22 Right Forearm (Active)   Site Assessment Clean, dry, & intact 07/25/22 0632   Phlebitis Assessment 0 07/25/22 0632   Infiltration Assessment 0 07/25/22 0632   Dressing Status Clean, dry, & intact 07/25/22 0632   Dressing Type Transparent 07/25/22 0632   Hub Color/Line Status Pink;Capped;Flushed 07/25/22 3690        Opportunity for questions and clarification was provided.       Patient transported with:   w/c

## 2022-07-25 NOTE — ED NOTES
Verbal shift change report given to CECILIA Grijalva Lpn (oncoming nurse) by Daron Hudson (offgoing nurse). Report included the following information SBAR and ED Summary.

## 2022-07-25 NOTE — ED NOTES
7:39 AM  Signed out by Dr Davida Stokes. Biliary colic vs acute cholecystitis; wbc and cmp w/o significant abnormalities.  Will consult Dr Bryant Houser.

## 2022-07-26 LAB
ALBUMIN SERPL-MCNC: 3 G/DL (ref 3.4–5)
ALBUMIN/GLOB SERPL: 0.9 {RATIO} (ref 0.8–1.7)
ALP SERPL-CCNC: 37 U/L (ref 45–117)
ALT SERPL-CCNC: 30 U/L (ref 13–56)
ANION GAP SERPL CALC-SCNC: 5 MMOL/L (ref 3–18)
AST SERPL W P-5'-P-CCNC: 14 U/L (ref 10–38)
BILIRUB SERPL-MCNC: 0.9 MG/DL (ref 0.2–1)
BUN SERPL-MCNC: 13 MG/DL (ref 7–18)
BUN/CREAT SERPL: 16 (ref 12–20)
CA-I BLD-MCNC: 8.8 MG/DL (ref 8.5–10.1)
CHLORIDE SERPL-SCNC: 107 MMOL/L (ref 100–111)
CO2 SERPL-SCNC: 26 MMOL/L (ref 21–32)
CREAT SERPL-MCNC: 0.8 MG/DL (ref 0.6–1.3)
ERYTHROCYTE [DISTWIDTH] IN BLOOD BY AUTOMATED COUNT: 15.4 % (ref 11.6–14.5)
GLOBULIN SER CALC-MCNC: 3.5 G/DL (ref 2–4)
GLUCOSE SERPL-MCNC: 84 MG/DL (ref 74–99)
HCT VFR BLD AUTO: 32.1 % (ref 35–45)
HGB BLD-MCNC: 10.2 G/DL (ref 12–16)
MCH RBC QN AUTO: 26 PG (ref 24–34)
MCHC RBC AUTO-ENTMCNC: 31.8 G/DL (ref 31–37)
MCV RBC AUTO: 81.9 FL (ref 78–100)
NRBC # BLD: 0 K/UL (ref 0–0.01)
NRBC BLD-RTO: 0 PER 100 WBC
PLATELET # BLD AUTO: 183 K/UL (ref 135–420)
PMV BLD AUTO: 11.5 FL (ref 9.2–11.8)
POTASSIUM SERPL-SCNC: 3.7 MMOL/L (ref 3.5–5.5)
PROT SERPL-MCNC: 6.5 G/DL (ref 6.4–8.2)
RBC # BLD AUTO: 3.92 M/UL (ref 4.2–5.3)
SODIUM SERPL-SCNC: 138 MMOL/L (ref 136–145)
WBC # BLD AUTO: 4.8 K/UL (ref 4.6–13.2)

## 2022-07-26 PROCEDURE — 74011250636 HC RX REV CODE- 250/636: Performed by: SURGERY

## 2022-07-26 PROCEDURE — 85027 COMPLETE CBC AUTOMATED: CPT

## 2022-07-26 PROCEDURE — 74011250636 HC RX REV CODE- 250/636: Performed by: NURSE PRACTITIONER

## 2022-07-26 PROCEDURE — 96376 TX/PRO/DX INJ SAME DRUG ADON: CPT

## 2022-07-26 PROCEDURE — 74011250636 HC RX REV CODE- 250/636: Performed by: INTERNAL MEDICINE

## 2022-07-26 PROCEDURE — 36415 COLL VENOUS BLD VENIPUNCTURE: CPT

## 2022-07-26 PROCEDURE — 74011000258 HC RX REV CODE- 258: Performed by: NURSE PRACTITIONER

## 2022-07-26 PROCEDURE — 80053 COMPREHEN METABOLIC PANEL: CPT

## 2022-07-26 PROCEDURE — 65270000029 HC RM PRIVATE

## 2022-07-26 PROCEDURE — 74011000258 HC RX REV CODE- 258: Performed by: INTERNAL MEDICINE

## 2022-07-26 PROCEDURE — 74011000250 HC RX REV CODE- 250: Performed by: NURSE PRACTITIONER

## 2022-07-26 PROCEDURE — G0378 HOSPITAL OBSERVATION PER HR: HCPCS

## 2022-07-26 RX ADMIN — PIPERACILLIN AND TAZOBACTAM 3.38 G: 3; .375 INJECTION, POWDER, FOR SOLUTION INTRAVENOUS at 06:49

## 2022-07-26 RX ADMIN — SODIUM CHLORIDE, PRESERVATIVE FREE 10 ML: 5 INJECTION INTRAVENOUS at 13:37

## 2022-07-26 RX ADMIN — PIPERACILLIN AND TAZOBACTAM 3.38 G: 3; .375 INJECTION, POWDER, FOR SOLUTION INTRAVENOUS at 15:25

## 2022-07-26 RX ADMIN — SODIUM CHLORIDE, PRESERVATIVE FREE 10 ML: 5 INJECTION INTRAVENOUS at 06:49

## 2022-07-26 RX ADMIN — PIPERACILLIN AND TAZOBACTAM 3.38 G: 3; .375 INJECTION, POWDER, FOR SOLUTION INTRAVENOUS at 22:20

## 2022-07-26 RX ADMIN — SODIUM CHLORIDE 75 ML/HR: 9 INJECTION, SOLUTION INTRAVENOUS at 17:23

## 2022-07-26 NOTE — PROGRESS NOTES
Surgery progress note      HD # 2    Subjective:  No C/O pain since last p.m. Ambulating well. Objective:  Looks well, in no distress  Afebrile, VSS  Abdomen is soft, RUQ tenderness resolved. No masses. Impression:  Resolving acute on chronic cholecystitis. Plan:  Allow clear liquids, increase activity  Continue Zosyn  I will advance diet when appropriate.

## 2022-07-26 NOTE — PROGRESS NOTES
Comprehensive Nutrition Assessment    Type and Reason for Visit: Initial    Nutrition Recommendations/Plan:   Clear liquids diet advance as tolerate to a cardiac diet     Malnutrition Assessment:  Malnutrition Status:  No malnutrition (07/26/22 1545)    Context:  Chronic illness     Findings of the 6 clinical characteristics of malnutrition:   Energy Intake:     Weight Loss: Body Fat Loss:   ,     Muscle Mass Loss:   ,    Fluid Accumulation:   ,     Strength:        Nutrition Assessment:    28 yo female PMH: migraines, cholecystitis    Nutrition Related Findings:    Overweight BMI 29.4 at 182 lbs. Pt reports wt has been stable despite sharp intermittent abdominal pain x 2 yrs. This abdominal pain worseed 3 days ago after eating a cheeseburger woke up with RUQ pain N/V. CT showed cholethiasis started on Zosyn and seen by surgery who has started clear liquids diet today. Provided pt with gallbladder nutriton therapy handout via nutrition care manual and emphasizing small frequent meals and controlling total fat intake. Today pt reports feeling better and will monitor for diet tolerance and advancement. Wound Type: None    Recent Results (from the past 24 hour(s))   METABOLIC PANEL, COMPREHENSIVE    Collection Time: 07/26/22  3:45 AM   Result Value Ref Range    Sodium 138 136 - 145 mmol/L    Potassium 3.7 3.5 - 5.5 mmol/L    Chloride 107 100 - 111 mmol/L    CO2 26 21 - 32 mmol/L    Anion gap 5 3.0 - 18.0 mmol/L    Glucose 84 74 - 99 mg/dL    BUN 13 7 - 18 mg/dL    Creatinine 0.80 0.60 - 1.30 mg/dL    BUN/Creatinine ratio 16 12 - 20      GFR est AA >60 >60 ml/min/1.73m2    GFR est non-AA >60 >60 ml/min/1.73m2    Calcium 8.8 8.5 - 10.1 mg/dL    Bilirubin, total 0.9 0.2 - 1.0 mg/dL    AST (SGOT) 14 10 - 38 U/L    ALT (SGPT) 30 13 - 56 U/L    Alk.  phosphatase 37 (L) 45 - 117 U/L    Protein, total 6.5 6.4 - 8.2 g/dL    Albumin 3.0 (L) 3.4 - 5.0 g/dL    Globulin 3.5 2.0 - 4.0 g/dL    A-G Ratio 0.9 0.8 - 1.7 CBC W/O DIFF    Collection Time: 07/26/22  3:45 AM   Result Value Ref Range    WBC 4.8 4.6 - 13.2 K/uL    RBC 3.92 (L) 4.20 - 5.30 M/uL    HGB 10.2 (L) 12.0 - 16.0 g/dL    HCT 32.1 (L) 35.0 - 45.0 %    MCV 81.9 78.0 - 100.0 FL    MCH 26.0 24.0 - 34.0 PG    MCHC 31.8 31.0 - 37.0 g/dL    RDW 15.4 (H) 11.6 - 14.5 %    PLATELET 669 769 - 647 K/uL    MPV 11.5 9.2 - 11.8 FL    NRBC 0.0 0.0  WBC    ABSOLUTE NRBC 0.00 0.00 - 0.01 K/uL        Current Nutrition Intake & Therapies:  Average Meal Intake: 1-25%  Average Supplement Intake: None ordered  ADULT DIET Clear Liquid    Anthropometric Measures:  Height: 5' 6\" (167.6 cm)  Ideal Body Weight (IBW): 130 lbs (59 kg)  Admission Body Weight: 182 lb  Current Body Wt:  82.6 kg (182 lb), 140 % IBW. Bed scale  Current BMI (kg/m2): 29.4        Weight Adjustment: No adjustment                 BMI Category: Overweight (BMI 25.0-29. 9)    Estimated Daily Nutrient Needs:  Energy Requirements Based On: Kcal/kg (20-25 kcal/kg)  Weight Used for Energy Requirements: Admission (83 kg)  Energy (kcal/day): 3728-5605 kcal/day  Weight Used for Protein Requirements: Admission (0.8-1 g/kg)  Protein (g/day): 66-83 g/day  Method Used for Fluid Requirements: 1 ml/kcal  Fluid (ml/day): 3088-1123 mL/day    Nutrition Diagnosis:   Predicted inadequate energy intake related to altered GI function as evidenced by intake 0-25%, nausea, vomiting, GI abnormality    Nutrition Interventions:   Food and/or Nutrient Delivery: Continue current diet  Nutrition Education/Counseling: Education initiated, Education completed  Coordination of Nutrition Care: Continue to monitor while inpatient       Goals:     Goals: PO intake 75% or greater, by next RD assessment, Teach back diet education       Nutrition Monitoring and Evaluation:   Behavioral-Environmental Outcomes: Knowledge or skill  Food/Nutrient Intake Outcomes: Food and nutrient intake  Physical Signs/Symptoms Outcomes: Nausea/vomiting, GI status, Nutrition focused physical findings, Weight, Meal time behavior    Follow up 7/29/2022    Discharge Planning:     Too soon to determine    [address]: WILDER [phone]

## 2022-07-26 NOTE — PROGRESS NOTES
Problem: Falls - Risk of  Goal: *Absence of Falls  Description: Document Tawnya Shipley Fall Risk and appropriate interventions in the flowsheet.   Outcome: Progressing Towards Goal  Note: Fall Risk Interventions:

## 2022-07-26 NOTE — PROGRESS NOTES
Problem: Pain  Goal: *Control of Pain  Outcome: Progressing Towards Goal  Goal: *PALLIATIVE CARE:  Alleviation of Pain  Outcome: Progressing Towards Goal     Problem: Patient Education: Go to Patient Education Activity  Goal: Patient/Family Education  Outcome: Progressing Towards Goal     Problem: General Medical Care Plan  Goal: *Vital signs within specified parameters  Outcome: Progressing Towards Goal  Goal: *Labs within defined limits  Outcome: Progressing Towards Goal  Goal: *Absence of infection signs and symptoms  Outcome: Progressing Towards Goal  Goal: *Optimal pain control at patient's stated goal  Outcome: Progressing Towards Goal  Goal: *Skin integrity maintained  Outcome: Progressing Towards Goal  Goal: *Fluid volume balance  Outcome: Progressing Towards Goal  Goal: *Optimize nutritional status  Outcome: Progressing Towards Goal  Goal: *Anxiety reduced or absent  Outcome: Progressing Towards Goal  Goal: *Progressive mobility and function (eg: ADL's)  Outcome: Progressing Towards Goal

## 2022-07-26 NOTE — PROGRESS NOTES
0700- Assumed care of patient resting in bed patient denies any needs/pain at this time. CB in reach     1200- patient up ambulating in frye. 1400- family at bedside. Patient denies any needs at this time.

## 2022-07-26 NOTE — PROGRESS NOTES
1900- Assumed care of pt with pt walking in hallway with child and IV pump. NAD noted. A&O x4.     2100- Toradol and Zofran IV administered for c/o RUQ/L shoulder pain and nausea. No other complaints at this time. Call bell and possessions within reach. 2340- Pt resting quietly in bed with eyes closed. NAD noted. Pain reassessment completed. IV scheduled Zosyn administered. Call bell and possessions within reach. New bag IVF administered. 0230- Pt resting quietly in bed with eyes closed and NAD noted. 9421- Pt resting in bed with eyes closed. NAD noted. Scheduled Zosyn administered.

## 2022-07-26 NOTE — PROGRESS NOTES
Care Management Interventions  PCP Verified by CM: Yes (NO PCP )  Palliative Care Criteria Met (RRAT>21 & CHF Dx)?: No  Mode of Transport at Discharge: Other (see comment) (Anshul Ibarra per POV.)  Transition of Care Consult (CM Consult): Discharge Planning  Physical Therapy Consult: No  Occupational Therapy Consult: No  Speech Therapy Consult: No  Support Systems: Spouse/Significant Other, Parent(s), Child(salma)  Confirm Follow Up Transport: Self  The Plan for Transition of Care is Related to the Following Treatment Goals : Discharge planning with transition of pt home with choices for PCP and smooth transition to home, community, and PLOF. The Patient and/or Patient Representative was Provided with a Choice of Provider and Agrees with the Discharge Plan?: Yes  Name of the Patient Representative Who was Provided with a Choice of Provider and Agrees with the Discharge Plan: Pt- Mick Maria  Nathalie of Choice List was Provided with Basic Dialogue that Supports the Patient's Individualized Plan of Care/Goals, Treatment Preferences and Shares the Quality Data Associated with the Providers?: Yes  Discharge Location  Patient Expects to be Discharged to[de-identified] Home   Discharge planning/transition of care  RUR 11%  Reason for Admission:  Chart reviewed and pt interviewed. Adm noted for sharp abd pain after eating. Pt known to have cholecystitis on previous adm. She was without SOB, chest pain, fever or chills. States pain is sharp and radiates to right back. Surgical consult was ordered in the ED and pt was seen by Dr. Jhonatan Blanton who recom admission with NPO   status. PMH: Chiolecystitis, Migraine, smoking described as \"some days. \"  PSH: Back surgery for scoliosis, .     DX:  Acute cholecystitis due to biliary calculus     RUR Score:     11%                Plan for utilizing home health:   TBD    PCP: First and Last name:  None     Name of Practice:  Choices will be given at discharge   Are you a current patient: Yes/No:    Approximate date of last visit:    Can you participate in a virtual visit with your PCP:                     Current Advanced Directive/Advance Care Plan: Full Code      Healthcare Decision Maker:   Click here to complete [id number] including selection of the Healthcare Decision Maker Relationship (ie \"Primary\") [place], [phone], friend. ( Home and Cell the same.)           Transition of Care Plan:   Discharge planning with transition of care home with PCP and outpt services. Dr. Hall Aid following for possible percutaneous biliary drain. Teach back will be used for education and med will be reconciled on discharge. Pt 's progress will be monitored to identify needs for post acute care and follow-up. Choices for PCP will be addressed. CM following.

## 2022-07-26 NOTE — PROGRESS NOTES
HOSPITALIST PROGRESS NOTE  Glory Cao MD, [address]          Daily Progress Note: 2022      Subjective:     Patient is alert and oriented x4. No overnight significant abdominal pain, nausea/vomiting, fever/chills or diarrhea. Significantly improved and willing to try trials of clears at this time. Medications reviewed  Current Facility-Administered Medications   Medication Dose Route Frequency    sodium chloride (NS) flush 5-40 mL  5-40 mL IntraVENous Q8H    sodium chloride (NS) flush 5-40 mL  5-40 mL IntraVENous PRN    acetaminophen (TYLENOL) tablet 650 mg  650 mg Oral Q6H PRN    Or    acetaminophen (TYLENOL) suppository 650 mg  650 mg Rectal Q6H PRN    polyethylene glycol (MIRALAX) packet 17 g  17 g Oral DAILY PRN    ondansetron (ZOFRAN ODT) tablet 4 mg  4 mg Oral Q8H PRN    Or    ondansetron (ZOFRAN) injection 4 mg  4 mg IntraVENous Q6H PRN    0.9% sodium chloride infusion  75 mL/hr IntraVENous CONTINUOUS    piperacillin-tazobactam (ZOSYN) 3.375 g in 0.9% sodium chloride (MBP/ADV) 100 mL MBP  3.375 g IntraVENous Q8H    ketorolac (TORADOL) injection 30 mg  30 mg IntraVENous Q6H PRN    oxyCODONE-acetaminophen (PERCOCET) 5-325 mg per tablet 2 Tablet  2 Tablet Oral Q8H PRN       Review of Systems:   A comprehensive review of systems was negative except for that written in the HPI. Objective:   Physical Exam:     Visit Vitals  /80   Pulse 62   Temp 98.4 °F (36.9 °C)   Resp 18   Ht 5' 6\" (1.676 m)   Wt 82.7 kg (182 lb 4.8 oz)   LMP 2022   SpO2 100%   BMI 29.42 kg/m²      O2 Device: None (Room air)  Patient Vitals for the past 8 hrs:   Temp Pulse Resp BP SpO2   22 1130 98.4 °F (36.9 °C) 62 18 127/80 --   22 0731 97.9 °F (36.6 °C) 71 16 (!) 106/59 100 %          Temp (24hrs), Av.6 °F (36.4 °C), Min:96.8 °F (36 °C), Max:98.4 °F (36.9 °C)    No intake/output data recorded.    1901 -  0700  In: 1761.7 [I.V.:1761.7]  Out: -     General:  Alert, cooperative, no distress, appears stated age. Lungs:   Clear to auscultation bilaterally. Chest wall:  No tenderness or deformity. Heart:  Regular rate and rhythm, S1, S2 normal, no murmur, click, rub or gallop. Abdomen:   Soft, non-tender. Bowel sounds normal. No masses,  No organomegaly. Extremities: Extremities normal, atraumatic, no cyanosis or edema. Pulses: 2+ and symmetric all extremities. Skin: Skin color, texture, turgor normal. No rashes or lesions   Neurologic: CNII-XII intact. No gross sensory or motor deficits     Data Review:       Recent Days:  Recent Labs     07/26/22 0345 07/25/22  0629 07/24/22  0445   WBC 4.8 5.0 4.7   HGB 10.2* 11.9* 10.6*   HCT 32.1* 37.1 33.5*    222 178     Recent Labs     07/26/22  0345 07/25/22  0629 07/24/22  0445    139 137   K 3.7 3.7 3.7    108 106   CO2 26 24 28   GLU 84 109* 101*   BUN 13 13 5*   CREA 0.80 0.86 0.76   CA 8.8 9.3 8.8   MG  --   --  2.2   ALB 3.0* 3.6 3.0*   TBILI 0.9 0.3 0.6   ALT 30 37 39     No results for input(s): PH, PCO2, PO2, HCO3, FIO2 in the last 72 hours. 24 Hour Results:  Recent Results (from the past 24 hour(s))   METABOLIC PANEL, COMPREHENSIVE    Collection Time: 07/26/22  3:45 AM   Result Value Ref Range    Sodium 138 136 - 145 mmol/L    Potassium 3.7 3.5 - 5.5 mmol/L    Chloride 107 100 - 111 mmol/L    CO2 26 21 - 32 mmol/L    Anion gap 5 3.0 - 18.0 mmol/L    Glucose 84 74 - 99 mg/dL    BUN 13 7 - 18 mg/dL    Creatinine 0.80 0.60 - 1.30 mg/dL    BUN/Creatinine ratio 16 12 - 20      GFR est AA >60 >60 ml/min/1.73m2    GFR est non-AA >60 >60 ml/min/1.73m2    Calcium 8.8 8.5 - 10.1 mg/dL    Bilirubin, total 0.9 0.2 - 1.0 mg/dL    AST (SGOT) 14 10 - 38 U/L    ALT (SGPT) 30 13 - 56 U/L    Alk.  phosphatase 37 (L) 45 - 117 U/L    Protein, total 6.5 6.4 - 8.2 g/dL    Albumin 3.0 (L) 3.4 - 5.0 g/dL    Globulin 3.5 2.0 - 4.0 g/dL    A-G Ratio 0.9 0.8 - 1.7 CBC W/O DIFF    Collection Time: 07/26/22  3:45 AM   Result Value Ref Range    WBC 4.8 4.6 - 13.2 K/uL    RBC 3.92 (L) 4.20 - 5.30 M/uL    HGB 10.2 (L) 12.0 - 16.0 g/dL    HCT 32.1 (L) 35.0 - 45.0 %    MCV 81.9 78.0 - 100.0 FL    MCH 26.0 24.0 - 34.0 PG    MCHC 31.8 31.0 - 37.0 g/dL    RDW 15.4 (H) 11.6 - 14.5 %    PLATELET 700 076 - 319 K/uL    MPV 11.5 9.2 - 11.8 FL    NRBC 0.0 0.0  WBC    ABSOLUTE NRBC 0.00 0.00 - 0.01 K/uL           Assessment/Plan:     Acute cholecystitis due to biliary calculus  General surgery following. NPO with IV fluids  Continue IV Zosyn for now. Significant improved and with trial of clears to be started per general surgery with potential for discharge on 7/28/2022. Care Plan discussed with: Patient/Family, Nurse, and     Total time spent with patient: 30 minutes. With greater than 50% spent in coordination of care and counseling.     Adrian Campbell MD

## 2022-07-27 VITALS
TEMPERATURE: 97.5 F | WEIGHT: 182.3 LBS | BODY MASS INDEX: 29.3 KG/M2 | HEART RATE: 78 BPM | DIASTOLIC BLOOD PRESSURE: 85 MMHG | RESPIRATION RATE: 14 BRPM | HEIGHT: 66 IN | OXYGEN SATURATION: 99 % | SYSTOLIC BLOOD PRESSURE: 118 MMHG

## 2022-07-27 PROCEDURE — 99231 SBSQ HOSP IP/OBS SF/LOW 25: CPT | Performed by: SURGERY

## 2022-07-27 PROCEDURE — G0378 HOSPITAL OBSERVATION PER HR: HCPCS

## 2022-07-27 PROCEDURE — 74011000250 HC RX REV CODE- 250: Performed by: NURSE PRACTITIONER

## 2022-07-27 PROCEDURE — 96376 TX/PRO/DX INJ SAME DRUG ADON: CPT

## 2022-07-27 PROCEDURE — 74011250636 HC RX REV CODE- 250/636: Performed by: SURGERY

## 2022-07-27 PROCEDURE — 74011000258 HC RX REV CODE- 258: Performed by: INTERNAL MEDICINE

## 2022-07-27 PROCEDURE — 74011250636 HC RX REV CODE- 250/636: Performed by: INTERNAL MEDICINE

## 2022-07-27 RX ADMIN — SODIUM CHLORIDE 75 ML/HR: 9 INJECTION, SOLUTION INTRAVENOUS at 00:26

## 2022-07-27 RX ADMIN — SODIUM CHLORIDE, PRESERVATIVE FREE 10 ML: 5 INJECTION INTRAVENOUS at 06:14

## 2022-07-27 RX ADMIN — PIPERACILLIN AND TAZOBACTAM 3.38 G: 3; .375 INJECTION, POWDER, FOR SOLUTION INTRAVENOUS at 06:14

## 2022-07-27 NOTE — DISCHARGE SUMMARY
Discharge Summary       PATIENT ID: Jess Wolf  MRN: [id number]   YOB: 1995    DATE OF ADMISSION: 7/25/2022  6:18 AM    DATE OF DISCHARGE: 07/27/22    PRIMARY CARE PROVIDER: None     ATTENDING PHYSICIAN: Sheree Cobb MD  DISCHARGING PROVIDER: Sheree Cobb MD        CONSULTATIONS: None    PROCEDURES/SURGERIES: * No surgery found *    ADMITTING [address] [place] [address] COURSE:   Jess Wolf is a 27 y.o. female  has a past medical history of Cholecystitis and Migraines. Patient admitted recently discharged 7/24 after being admitted for cholecystitis. She reports having sharp intermittent abdominal pain for approximatley 2 years but worsened 3 days prior to coming to ED 7/22. Patient was admitted and treated with IV abx and discharged home on PO abx and low fat diet. Plan was to follow-up with general surgery for possible cholecystectomy in 6-8 weeks. Patient stated she ate a cheeseburger last night for dinner and was awaken this morning with severe RUQ pain similar to pain prior to last admission. CT scan done in ED, showed continued chololithiasis. C/o nausea and vomiting this am which has since resolved. Denies SOB, chest pain, fever, chills. C/o RUQ abdominal pain 5/10. Pain is sharp in nature and radiates to right mid back at times. Pain is worst with movement. Improved with medications and rest.       Admit patient to medical surgical unit. Seen by general surgery in ED. Recommended to admit and make NPO. Treat with IV abx. Possible percutaneous drain placement. Patient agrees with plan. DISCHARGE DIAGNOSES / PLAN:      Assessment/Plan:      Acute cholecystitis due to biliary calculus  General surgery following. NPO with IV fluids  Continue IV Zosyn for now. Significant improved and with trial of clears to be started per general surgery with potential for discharge on 7/28/2022.         Care Plan discussed with: Patient/Family, Nurse, and       Per surgery  Surgery progress note        Subj:  No complaints of abdominal pain. Tolerating clear liquids without pain, Nausea or emesis. Obj:  Afebrile, VSS  In no distress. Abdomen is soft. No RUQ or other abdominal tenderness. No masses. Imp:  Resolving acute on chronic cholecystitis. Cholelithiasis. Plan: Adv to low fat diet. Has been advised of what to avoid by dietary. Discharge later today if tolerates. Continue P.O. cipro at home. F/U with me 2 weeks. Day of dc  Tolerating low fat diet  Instructed with repeat back what a low fat diet it  Ok for home on cipro, fu with dr Tico Elkins of surgery      DIET: Low fat, Low cholesterol      DISCHARGE MEDICATIONS:  Current Discharge Medication List        CONTINUE these medications which have NOT CHANGED    Details   ibuprofen 200 mg cap Take 200 mg by mouth as needed for Pain. ciprofloxacin HCl (CIPRO) 500 mg tablet Take 1 Tablet by mouth two (2) times a day for 7 days. Qty: 14 Tablet, Refills: 0      norgestimate-ethinyl estradioL (ORTHO-CYCLEN, SPRINTEC) 0.25-35 mg-mcg tab Take 1 Tablet by mouth in the morning. NOTIFY YOUR PHYSICIAN FOR ANY OF THE FOLLOWING:   Fever over 101 degrees for 24 hours. Chest pain, shortness of breath, fever, chills, nausea, vomiting, diarrhea, change in mentation, falling, weakness, bleeding. Severe pain or pain not relieved by medications. Or, any other signs or symptoms that you may have questions about.     DISPOSITION:home with planned readdmission for rc hunt    Home With:   OT  PT  HH  RN       Long term SNF/Inpatient Rehab    Independent/assisted living    Hospice    Other:       PATIENT CONDITION AT DISCHARGE:     Functional status    Poor     Deconditioned    x Independent      Cognition   x  Lucid     Forgetful     Dementia      Catheters/lines (plus indication)    Valdez     PICC     PEG    x None      Code status    x Full code     DNR      PHYSICAL EXAMINATION AT DISCHARGE:  General: Alert, cooperative, no distress, appears stated age. HEENT:           Atraumatic, anicteric sclerae, pink conjunctivae                          No oral ulcers, mucosa moist, throat clear, dentition fair  Neck:               Supple, symmetrical  Lungs:             Clear to auscultation bilaterally. No Wheezing or Rhonchi. No rales. Chest wall:      No tenderness  No Accessory muscle use. Heart:              Regular  rhythm,  No  murmur   No edema  Abdomen:        Soft, non-tender. Not distended. Bowel sounds normal  Extremities:     No cyanosis. No clubbing,                            Skin turgor normal, Capillary refill normal  Skin:                Not pale. Not Jaundiced  No rashes   Psych:             Not anxious or agitated.   Neurologic:      Alert, moves all extremities, answers questions appropriately and responds to commands       CHRONIC MEDICAL DIAGNOSES:  Problem List as of 7/27/2022 Never Reviewed            Codes Class Noted - Resolved    Acute cholecystitis due to biliary calculus ICD-10-CM: K80.00  ICD-9-CM: 574.00  7/25/2022 - Present        Acute cholecystitis ICD-10-CM: K81.0  ICD-9-CM: 575.0  7/22/2022 - Present           Greater than 35 minutes were spent with the patient on counseling and coordination of care    Signed:   Jorge Regan MD  7/27/2022  1:12 PM

## 2022-07-27 NOTE — PROGRESS NOTES
Surgery progress note      Subj:  No complaints of abdominal pain. Tolerating clear liquids without pain, Nausea or emesis. Obj:  Afebrile, VSS  In no distress. Abdomen is soft. No RUQ or other abdominal tenderness. No masses. Imp:  Resolving acute on chronic cholecystitis. Cholelithiasis. Plan: Adv to low fat diet. Has been advised of what to avoid by dietary. Discharge later today if tolerates. Continue P.O. cipro at home. F/U with me 2 weeks.

## 2022-07-27 NOTE — PROGRESS NOTES
Comprehensive Nutrition Assessment    Type and Reason for Visit: Follow Up - education    Nutrition Recommendations/Plan:   Low fat cardiac diet instruction     Malnutrition Assessment:  Malnutrition Status:  No malnutrition (07/26/22 6725)    Context:  Chronic illness     Findings of the 6 clinical characteristics of malnutrition:   Energy Intake:     Weight Loss: Body Fat Loss:   ,     Muscle Mass Loss:   ,    Fluid Accumulation:   ,     Strength:        Nutrition Assessment:    26 yo female PMH: migraines, cholecystitis. Pt consumed a cheeseburger on low fat diet PTA and developed abdominal pain, distention and diarrhea. Pt complains of abdomen tenderness, diarrhea (started with administration of antibiotic). Nutrition Related Findings:    Overweight BMI 29.4 at 182 lbs. Pt reports wt has been stable despite sharp intermittent abdominal pain x 2 yrs. This abdominal pain worseed 3 days ago after eating a cheeseburger woke up with RUQ pain N/V. CT showed cholethiasis started on Zosyn and seen by surgery who has started clear liquids diet today. Provided pt with gallbladder nutriton therapy handout via nutrition care manual and emphasizing small frequent meals and controlling total fat intake. Today pt reports feeling better and will monitor for diet tolerance and advancement. Wound Type: None    No results found for this or any previous visit (from the past 24 hour(s)). Current Nutrition Intake & Therapies:  Average Meal Intake: 1-25%  Average Supplement Intake: None ordered  ADULT DIET Regular; Low Fat (Less than or Equal to 50 gm/day)    Anthropometric Measures:  Height: 5' 6\" (167.6 cm)  Ideal Body Weight (IBW): 130 lbs (59 kg)  Admission Body Weight: 182 lb  Current Body Wt:  82.6 kg (182 lb), 140 % IBW. Bed scale  Current BMI (kg/m2): 29.4        Weight Adjustment: No adjustment                 BMI Category: Overweight (BMI 25.0-29. 9)    Estimated Daily Nutrient Needs:  Energy Requirements Based On: Kcal/kg (20-25 kcal/kg)  Weight Used for Energy Requirements: Admission (83 kg)  Energy (kcal/day): 8122-3923 kcal/day  Weight Used for Protein Requirements: Admission (0.8-1 g/kg)  Protein (g/day): 66-83 g/day  Method Used for Fluid Requirements: 1 ml/kcal  Fluid (ml/day): 7735-4977 mL/day    Nutrition Diagnosis:   Predicted inadequate energy intake related to altered GI function as evidenced by intake 0-25%, nausea, vomiting, GI abnormality    Nutrition Interventions:   Food and/or Nutrient Delivery: Continue current diet  Nutrition Education/Counseling: Education initiated, Education completed  Coordination of Nutrition Care: Continue to monitor while inpatient  07/27/2022 - Reviewed low fat diet with patient and asked questions to determine understanding. Pt was able to answer questions with 75% accuracy. Worked with patient on substitutions for favorite foods meeting low fat diet restrictions. After instruction, pt was able to verbalize 3 menus meeting guidelines and containing foods she will consume. Pt was able to verbalize menu choices from local favorite restaurants that meet diet guidelines. Goals:     Goals: PO intake 75% or greater, by next RD assessment, Teach back diet education - Met goal of returned demonstration on diet education. Nutrition Monitoring and Evaluation:   Behavioral-Environmental Outcomes: Knowledge or skill  Food/Nutrient Intake Outcomes: Food and nutrient intake  Physical Signs/Symptoms Outcomes: Nausea/vomiting, GI status, Nutrition focused physical findings, Weight, Meal time behavior    Follow up 7/29/2022    Discharge Planning:    Low fat diet less than 50 grams per day until surgical intervention for cholecystitis. Name, phone number and email address given to patient for further questions/concerns. Discussed education results with Dr. Hui Archibald. Margaret Almeida RD   Contact: [facility] [phone] or perfect serve.

## 2022-07-27 NOTE — PROGRESS NOTES
Provided patient with discharge education, including medication and follow up appointment. Patient verbalized she understood and didn't have any concerns. Patient took all of her belongings. Refused wheelchair and ambulated herself downstairs.

## 2022-07-27 NOTE — PROGRESS NOTES
Received report via walking rounds , pt stated she had a  diarrhea  this morning, was upset  reassure . @5710  Lunch offered no problem noted. @1300  Up ambulating  around nursing station with no problem.

## 2022-07-27 NOTE — PROGRESS NOTES
Problem: Pain  Goal: *Control of Pain  Outcome: Progressing Towards Goal  Goal: *PALLIATIVE CARE:  Alleviation of Pain  Outcome: Progressing Towards Goal     Problem: Patient Education: Go to Patient Education Activity  Goal: Patient/Family Education  Outcome: Progressing Towards Goal     Problem: General Medical Care Plan  Goal: *Vital signs within specified parameters  Outcome: Progressing Towards Goal  Goal: *Labs within defined limits  Outcome: Progressing Towards Goal  Goal: *Absence of infection signs and symptoms  Outcome: Progressing Towards Goal  Goal: *Optimal pain control at patient's stated goal  Outcome: Progressing Towards Goal  Goal: *Skin integrity maintained  Outcome: Progressing Towards Goal  Goal: *Fluid volume balance  Outcome: Progressing Towards Goal  Goal: *Optimize nutritional status  Outcome: Progressing Towards Goal  Goal: *Anxiety reduced or absent  Outcome: Progressing Towards Goal  Goal: *Progressive mobility and function (eg: ADL's)  Outcome: Progressing Towards Goal     Problem: Patient Education: Go to Patient Education Activity  Goal: Patient/Family Education  Outcome: Progressing Towards Goal     Problem: Falls - Risk of  Goal: *Absence of Falls  Description: Document Gerard Fall Risk and appropriate interventions in the flowsheet.   Outcome: Progressing Towards Goal  Note: Fall Risk Interventions:                                Problem: Patient Education: Go to Patient Education Activity  Goal: Patient/Family Education  Outcome: Progressing Towards Goal     Problem: Nutrition Deficit  Goal: *Optimize nutritional status  Outcome: Progressing Towards Goal     Problem: Patient Education: Go to Patient Education Activity  Goal: Patient/Family Education  Outcome: Progressing Towards Goal

## 2022-08-10 ENCOUNTER — OFFICE VISIT (OUTPATIENT)
Dept: SURGERY | Age: 27
End: 2022-08-10
Payer: MEDICAID

## 2022-08-10 VITALS
DIASTOLIC BLOOD PRESSURE: 69 MMHG | HEART RATE: 64 BPM | SYSTOLIC BLOOD PRESSURE: 111 MMHG | BODY MASS INDEX: 28.54 KG/M2 | WEIGHT: 177.6 LBS | HEIGHT: 66 IN

## 2022-08-10 DIAGNOSIS — K80.00 ACUTE CHOLECYSTITIS DUE TO BILIARY CALCULUS: Primary | ICD-10-CM

## 2022-08-10 DIAGNOSIS — K80.50 BILIARY COLIC: ICD-10-CM

## 2022-08-10 PROCEDURE — 99213 OFFICE O/P EST LOW 20 MIN: CPT | Performed by: SURGERY

## 2022-08-10 NOTE — PROGRESS NOTES
Milton Alvares presents today for Acute cholecystitis due to biliary calculus. Patient states that she has been having some pain but has been able to manage it. Is someone accompanying this pt? No    Is the patient using any DME equipment during OV? No    Depression Screening:  3 most recent PHQ Screens 8/10/2022   Little interest or pleasure in doing things Not at all   Feeling down, depressed, irritable, or hopeless Not at all   Total Score PHQ 2 0       Learning Assessment:  No flowsheet data found. Health Maintenance reviewed and discussed and ordered per Provider. Health Maintenance Due   Topic Date Due    Hepatitis C Screening  Never done    Depression Screen  Never done    COVID-19 Vaccine (1) Never done    Pneumococcal 0-64 years (1 - PCV) Never done    HPV Age 9Y-34Y (1 - 2-dose series) Never done    Pap Smear  Never done   . Coordination of Care:  1. \"Have you been to the ER, urgent care clinic since your last visit? Hospitalized since your last visit? \" Yes, ER here in [facility], 7/25/2022    2. \"Have you seen or consulted any other health care providers outside of the [address] [place] [address] since your last visit? \" No     3. For patients aged 39-70: Has the patient had a colonoscopy? NA - based on age     If the patient is female:    4. For patients aged 41-77: Has the patient had a mammogram within the past 2 years? NA - based on age    11. For patients aged 21-65: Has the patient had a pap smear?  No

## 2022-08-10 NOTE — PROGRESS NOTES
Office note    Subjective: The patient is a 26 y/o female recently admitted with RUQ pain and presumptively treated for acute cholecystitis with IV antibiotics. She was discharged home on a low fat diet and pain free. She apparently did not follow a low fat diet, ate a cheeseburger and had recurrent pain causing her to be readmitted. She was again treated nonoperatively with resolution of symptoms. She was instructed in a low fat diet by dietary and discharged home pain free. She has been essentially asymptomatic since her discharge. She had some mild RUQ pain after consuming whole milk. Of note, US of her GB suggested a dilated CBD. She denies jaundice, neda colored stools, dark urine or fever. Objective:  26 y/o female in no distress  Abdomen io soft, no masses, not tender. Sclera are not jaundiced    Imp:  Resolved biliary colic vs acute cholecystitis. ? CBD dilitation on US    Plan:  Repeat US to evaluate bile duct again  Continue low fat diet  Follow up 6 weeks for scan review and to discuss elective cholecystectomy.

## 2022-08-24 ENCOUNTER — HOSPITAL ENCOUNTER (OUTPATIENT)
Dept: ULTRASOUND IMAGING | Age: 27
Discharge: HOME | End: 2022-08-24
Attending: SURGERY
Payer: MEDICAID

## 2022-08-24 DIAGNOSIS — K80.00 ACUTE CHOLECYSTITIS DUE TO BILIARY CALCULUS: ICD-10-CM

## 2022-08-24 PROCEDURE — 76705 ECHO EXAM OF ABDOMEN: CPT

## 2022-09-21 ENCOUNTER — OFFICE VISIT (OUTPATIENT)
Dept: SURGERY | Age: 27
End: 2022-09-21
Payer: MEDICAID

## 2022-09-21 VITALS
HEART RATE: 55 BPM | DIASTOLIC BLOOD PRESSURE: 41 MMHG | SYSTOLIC BLOOD PRESSURE: 109 MMHG | BODY MASS INDEX: 26.78 KG/M2 | HEIGHT: 66 IN | WEIGHT: 166.6 LBS

## 2022-09-21 DIAGNOSIS — K80.50 BILIARY COLIC: Primary | ICD-10-CM

## 2022-09-21 PROCEDURE — 99214 OFFICE O/P EST MOD 30 MIN: CPT | Performed by: SURGERY

## 2022-09-21 NOTE — PROGRESS NOTES
Pj Oconnor presents today for follow up of test results. Chief Complaint   Patient presents with    Follow-up     Test results       Is someone accompanying this pt? No    Is the patient using any DME equipment during OV? No    Depression Screening:  3 most recent PHQ Screens 9/21/2022   Little interest or pleasure in doing things Not at all   Feeling down, depressed, irritable, or hopeless Not at all   Total Score PHQ 2 0       Learning Assessment:  No flowsheet data found. Health Maintenance reviewed and discussed and ordered per Provider. Health Maintenance Due   Topic Date Due    Hepatitis C Screening  Never done    COVID-19 Vaccine (1) Never done    Pneumococcal 0-64 years (1 - PCV) Never done    HPV Age 9Y-34Y (1 - 2-dose series) Never done    Pap Smear  Never done    Flu Vaccine (1) 08/01/2022   . Coordination of Care:  1. Have you been to the ER, urgent care clinic since your last visit? Hospitalized since your last visit? No    2. Have you seen or consulted any other health care providers outside of the [address] [place] [address] since your last visit? Include any pap smears or colon screening.  NO

## 2022-09-21 NOTE — PROGRESS NOTES
History and Physical    Patient: Bear Juan MRN: [id number]  SSN: [id number]    YOB: 1995  Age: 24 y.o. Sex: female      Subjective:      Bear Juan is a 24 y.o. female who is being seen for follow up of an episode of suspected biliary colic, possible cholecystitis. She presented to the E.R. with RUQ pain which radiated to her back. She has been pain free since being home on a low fat diet. Initial US showed gall stones with wall thickening and a prominent CBD. Follow up CT did not show cholecystitis. We repeated the ultrasound to evaluate her CBD again. The duct was normal size on this study. She also never had jaundice or elevated LFTs. She's here today to discuss the test results and to discuss possible elective cholecystectomy. No Known Allergies  Current Outpatient Medications   Medication Sig Dispense Refill    ibuprofen 200 mg cap Take 200 mg by mouth as needed for Pain.      norgestimate-ethinyl estradioL (ORTHO-CYCLEN, SPRINTEC) 0.25-35 mg-mcg tab Take 1 Tablet by mouth in the morning. Past Medical History:   Diagnosis Date    Cholecystitis     Migraines      Past Surgical History:   Procedure Laterality Date    HX BACK SURGERY      Back surgery for scoliosis    HX  SECTION        Social History     Tobacco Use    Smoking status: Some Days     Packs/day: 0.25     Types: Cigarettes    Smokeless tobacco: Never   Substance Use Topics    Alcohol use: Never     No family history on file. Review of Systems:    General: Denies fevers, chills, night sweats, fatigue, weight loss, or weight gain.     HEENT: Denies changes in auditory or visual acuity, recurrent pharyngitis, epistaxis, chronic rhinorrhea, vertigo    Respiratory: Denies increasing shortness of breath, productive cough, hemoptysis    Cardiac: Denies known history of cardiac disease, heart murmur, palpitations    GI: Denies dysphagia, recurrent emesis, hematemesis, changes in bowel habits, hematochezia, melena    : Denies hematuria frequency urgency dysuria    Musculoskeletal: Denies fractures, dislocations    Neurologic: Denies history of CVA, paralysis paresthesias, recurrent cephalgia, seizures    Endocrine: Denies polyuria, polydipsia, polyphagia, heat and cold intolerance    Lymph/heme: Denies a history of malignancy, anemia, bruising, blood transfusions    Integumentary: Negative for dermatitis     Objective:     Vitals:    09/21/22 0925   BP: (!) 109/41   Pulse: (!) 55   Weight: 75.6 kg (166 lb 9.6 oz)   Height: 5' 6\" (1.676 m)        General: no acute distress, nontoxic in appearance. Head: Normocephalic, atraumatic, sclera anicteric. Mouth: Clear, no overt lesions, oral mucosa is pink and moist.  Neck: Supple, no masses, no adenopathy or carotid bruits, trachea midline  Resp: Clear to auscultation bilaterally, no wheezing, rhonchi, or rales, excursions normal and symmetrical.  Cardio: Regular rate and rhythm, no murmurs, clicks, gallops, or rubs. Abdomen: soft, nontender, nondistended, normoactive bowel sounds, no hernias. No RUQ tenderness  Extremities: Warm, well perfused, no tenderness or swelling, normal gait/station, without edema or varicosities  Neuro: Sensation and strength grossly intact and symmetrical.  Psych: Alert and oriented to person, place, and time. Assessment:   Cholelithiasis, probably symptomatic      Plan:     Have offered to proceed with laparoscopic, possible open cholecystectomy. Discussed risk of bleeding, infection, bile leak and damage to bile ducts. She understands the risks and wishes to proceed with elective cholecystectomy.     Signed By: Beatriz Mariscal MD     September 21, 2022

## 2022-10-05 ENCOUNTER — TELEPHONE (OUTPATIENT)
Dept: SURGERY | Age: 27
End: 2022-10-05

## 2022-10-05 NOTE — TELEPHONE ENCOUNTER
Patient needs to do lab work before her surgery. Called patient 10/4/22 and left a message for the patient to return the call. Left a message on 10/5/22 asking for the patient to call us back.

## 2022-10-05 NOTE — TELEPHONE ENCOUNTER
Patient returned my call and I informed the patient that she needs to go to the lab to get her labwork done prior to her surgery on 10/11/22. Patient verbalized understanding.

## 2023-03-17 ENCOUNTER — HOSPITAL ENCOUNTER (EMERGENCY)
Age: 28
Discharge: HOME | End: 2023-03-17
Attending: EMERGENCY MEDICINE
Payer: MEDICAID

## 2023-03-17 ENCOUNTER — APPOINTMENT (OUTPATIENT)
Age: 28
End: 2023-03-17
Payer: MEDICAID

## 2023-03-17 VITALS
OXYGEN SATURATION: 99 % | WEIGHT: 185 LBS | TEMPERATURE: 97.9 F | BODY MASS INDEX: 29.73 KG/M2 | DIASTOLIC BLOOD PRESSURE: 76 MMHG | RESPIRATION RATE: 16 BRPM | SYSTOLIC BLOOD PRESSURE: 121 MMHG | HEIGHT: 66 IN | HEART RATE: 65 BPM

## 2023-03-17 DIAGNOSIS — K80.21 CALCULUS OF GALLBLADDER WITH BILIARY OBSTRUCTION BUT WITHOUT CHOLECYSTITIS: Primary | ICD-10-CM

## 2023-03-17 LAB
ALBUMIN SERPL-MCNC: 3.8 G/DL (ref 3.4–5)
ALBUMIN/GLOB SERPL: 1.1 (ref 0.8–1.7)
ALP SERPL-CCNC: 63 U/L (ref 45–117)
ALT SERPL-CCNC: 28 U/L (ref 13–56)
ANION GAP SERPL CALC-SCNC: 6 MMOL/L (ref 3–18)
APPEARANCE UR: ABNORMAL
AST SERPL W P-5'-P-CCNC: 12 U/L (ref 10–38)
BACTERIA URNS QL MICRO: ABNORMAL /HPF
BASOPHILS # BLD: 0.1 K/UL (ref 0–0.1)
BASOPHILS NFR BLD: 1 % (ref 0–2)
BILIRUB SERPL-MCNC: 0.3 MG/DL (ref 0.2–1)
BILIRUB UR QL: NEGATIVE
BUN SERPL-MCNC: 11 MG/DL (ref 7–18)
BUN/CREAT SERPL: 13 (ref 12–20)
CA-I BLD-MCNC: 9.2 MG/DL (ref 8.5–10.1)
CHLORIDE SERPL-SCNC: 108 MMOL/L (ref 100–111)
CO2 SERPL-SCNC: 26 MMOL/L (ref 21–32)
COLOR UR: YELLOW
CREAT SERPL-MCNC: 0.82 MG/DL (ref 0.6–1.3)
DIFFERENTIAL METHOD BLD: ABNORMAL
EOSINOPHIL # BLD: 0.4 K/UL (ref 0–0.4)
EOSINOPHIL NFR BLD: 5 % (ref 0–5)
EPITH CASTS URNS QL MICRO: ABNORMAL /LPF (ref 0–20)
ERYTHROCYTE [DISTWIDTH] IN BLOOD BY AUTOMATED COUNT: 15 % (ref 11.6–14.5)
GLOBULIN SER CALC-MCNC: 3.4 G/DL (ref 2–4)
GLUCOSE SERPL-MCNC: 114 MG/DL (ref 74–99)
GLUCOSE UR STRIP.AUTO-MCNC: NEGATIVE MG/DL
HCG UR QL: NEGATIVE
HCT VFR BLD AUTO: 39.1 % (ref 35–45)
HGB BLD-MCNC: 12.5 G/DL (ref 12–16)
HGB UR QL STRIP: NEGATIVE
IMM GRANULOCYTES # BLD AUTO: 0 K/UL (ref 0–0.04)
IMM GRANULOCYTES NFR BLD AUTO: 0 % (ref 0–0.5)
KETONES UR QL STRIP.AUTO: NEGATIVE MG/DL
LEUKOCYTE ESTERASE UR QL STRIP.AUTO: NEGATIVE
LIPASE SERPL-CCNC: 96 U/L (ref 73–393)
LYMPHOCYTES # BLD: 1.8 K/UL (ref 0.9–3.6)
LYMPHOCYTES NFR BLD: 25 % (ref 21–52)
MCH RBC QN AUTO: 26.7 PG (ref 24–34)
MCHC RBC AUTO-ENTMCNC: 32 G/DL (ref 31–37)
MCV RBC AUTO: 83.4 FL (ref 78–100)
MONOCYTES # BLD: 0.5 K/UL (ref 0.05–1.2)
MONOCYTES NFR BLD: 7 % (ref 3–10)
NEUTS SEG # BLD: 4.5 K/UL (ref 1.8–8)
NEUTS SEG NFR BLD: 62 % (ref 40–73)
NITRITE UR QL STRIP.AUTO: NEGATIVE
NRBC # BLD: 0 K/UL (ref 0–0.01)
NRBC BLD-RTO: 0 PER 100 WBC
PH UR STRIP: 8.5 (ref 5–8)
PLATELET # BLD AUTO: 196 K/UL (ref 135–420)
PMV BLD AUTO: 10.7 FL (ref 9.2–11.8)
POTASSIUM SERPL-SCNC: 3.8 MMOL/L (ref 3.5–5.5)
PROT SERPL-MCNC: 7.2 G/DL (ref 6.4–8.2)
PROT UR STRIP-MCNC: ABNORMAL MG/DL
RBC # BLD AUTO: 4.69 M/UL (ref 4.2–5.3)
RBC #/AREA URNS HPF: ABNORMAL /HPF (ref 0–2)
SODIUM SERPL-SCNC: 140 MMOL/L (ref 136–145)
SP GR UR REFRACTOMETRY: 1.03 (ref 1–1.03)
UROBILINOGEN UR QL STRIP.AUTO: 0.2 EU/DL (ref 0.2–1)
WBC # BLD AUTO: 7.2 K/UL (ref 4.6–13.2)
WBC URNS QL MICRO: ABNORMAL /HPF (ref 0–4)

## 2023-03-17 PROCEDURE — 96375 TX/PRO/DX INJ NEW DRUG ADDON: CPT

## 2023-03-17 PROCEDURE — 80053 COMPREHEN METABOLIC PANEL: CPT

## 2023-03-17 PROCEDURE — 81001 URINALYSIS AUTO W/SCOPE: CPT

## 2023-03-17 PROCEDURE — 99284 EMERGENCY DEPT VISIT MOD MDM: CPT

## 2023-03-17 PROCEDURE — 81025 URINE PREGNANCY TEST: CPT

## 2023-03-17 PROCEDURE — 2580000003 HC RX 258: Performed by: EMERGENCY MEDICINE

## 2023-03-17 PROCEDURE — 6360000002 HC RX W HCPCS: Performed by: EMERGENCY MEDICINE

## 2023-03-17 PROCEDURE — 83690 ASSAY OF LIPASE: CPT

## 2023-03-17 PROCEDURE — 96374 THER/PROPH/DIAG INJ IV PUSH: CPT

## 2023-03-17 PROCEDURE — 76705 ECHO EXAM OF ABDOMEN: CPT

## 2023-03-17 PROCEDURE — 85025 COMPLETE CBC W/AUTO DIFF WBC: CPT

## 2023-03-17 RX ORDER — ONDANSETRON 2 MG/ML
4 INJECTION INTRAMUSCULAR; INTRAVENOUS ONCE
Status: COMPLETED | OUTPATIENT
Start: 2023-03-17 | End: 2023-03-17

## 2023-03-17 RX ORDER — OXYCODONE HYDROCHLORIDE AND ACETAMINOPHEN 5; 325 MG/1; MG/1
1 TABLET ORAL EVERY 6 HOURS PRN
Qty: 5 TABLET | Refills: 0 | Status: SHIPPED | OUTPATIENT
Start: 2023-03-17 | End: 2023-03-20

## 2023-03-17 RX ORDER — 0.9 % SODIUM CHLORIDE 0.9 %
1000 INTRAVENOUS SOLUTION INTRAVENOUS ONCE
Status: COMPLETED | OUTPATIENT
Start: 2023-03-17 | End: 2023-03-17

## 2023-03-17 RX ORDER — KETOROLAC TROMETHAMINE 30 MG/ML
15 INJECTION, SOLUTION INTRAMUSCULAR; INTRAVENOUS ONCE
Status: COMPLETED | OUTPATIENT
Start: 2023-03-17 | End: 2023-03-17

## 2023-03-17 RX ORDER — ONDANSETRON 4 MG/1
4 TABLET, FILM COATED ORAL 3 TIMES DAILY PRN
Qty: 15 TABLET | Refills: 0 | Status: SHIPPED | OUTPATIENT
Start: 2023-03-17

## 2023-03-17 RX ADMIN — SODIUM CHLORIDE 1000 ML: 9 INJECTION, SOLUTION INTRAVENOUS at 08:36

## 2023-03-17 RX ADMIN — ONDANSETRON 4 MG: 2 INJECTION INTRAMUSCULAR; INTRAVENOUS at 08:36

## 2023-03-17 RX ADMIN — KETOROLAC TROMETHAMINE 15 MG: 30 INJECTION, SOLUTION INTRAMUSCULAR at 08:39

## 2023-03-17 ASSESSMENT — PAIN - FUNCTIONAL ASSESSMENT
PAIN_FUNCTIONAL_ASSESSMENT: 0-10

## 2023-03-17 ASSESSMENT — PAIN SCALES - GENERAL
PAINLEVEL_OUTOF10: 3
PAINLEVEL_OUTOF10: 3
PAINLEVEL_OUTOF10: 7

## 2023-03-17 ASSESSMENT — PAIN DESCRIPTION - LOCATION: LOCATION: ABDOMEN

## 2023-03-17 ASSESSMENT — PAIN DESCRIPTION - ORIENTATION: ORIENTATION: RIGHT;UPPER

## 2023-03-17 NOTE — Clinical Note
Vanesa Betancur was seen and treated in our emergency department on 3/17/2023. She may return to work on 03/21/2023. If you have any questions or concerns, please don't hesitate to call.       Shilo Hall MD

## 2023-03-17 NOTE — ED TRIAGE NOTES
Pt states she has gallstones, states she was supposed to have her gallbladder removed a year ago but \"life happened\" and she did not have the surgery.    Pain started night before last, but resolved  Pt states it started again last night  Pt last ate [employer]    Pt also c/o vomiting

## 2023-03-17 NOTE — ED PROVIDER NOTES
[facility] EMERGENCY DEPT  EMERGENCY DEPARTMENT ENCOUNTER      Pt Name: El Quesada  MRN: [id number]  [place] 1995  Date of evaluation: 3/17/2023  Provider: Linda Garcia MD    [address] [place] [address]       Chief Complaint   Patient presents with    Abdominal Pain         HISTORY OF PRESENT ILLNESS   (Location/Symptom, Timing/Onset, Context/Setting, Quality, Duration, Modifying Factors, Severity)  Note limiting factors. El Quesada is a 24 y.o. female who presents to the emergency department who presents for delayed evaluation of right upper quadrant abdominal pain with associated nausea that began last night after eating spinach dip while at work. No alleviating factors attempted. No other aggravating factors. Denies any recent systemic symptoms, recent antibiotics or antipyretics. No change to the character or severity. Had a diagnosis of biliary colic approximately 1 year ago which she was unable to schedule her interval cholecystectomy. The history is provided by the patient and medical records. Nursing Notes were reviewed. REVIEW OF SYSTEMS    (2-9 systems for level 4, 10 or more for level 5)     Review of Systems   All other systems reviewed and are negative. Except as noted above the remainder of the review of systems was reviewed and negative. PAST MEDICAL HISTORY     Past Medical History:   Diagnosis Date    Cholecystitis     Migraines          SURGICAL HISTORY       Past Surgical History:   Procedure Laterality Date    BACK SURGERY      Back surgery for scoliosis     SECTION           CURRENT MEDICATIONS       Previous Medications    NORGESTIMATE-ETHINYL ESTRADIOL (ORTHO-CYCLEN) 0.25-35 MG-MCG PER TABLET    Take 1 tablet by mouth daily       ALLERGIES     Patient has no known allergies. FAMILY HISTORY     History reviewed. No pertinent family history.        SOCIAL HISTORY       Social History     Socioeconomic History    Marital status: Single     Spouse name: None Number of children: None    Years of education: None    Highest education level: None   Tobacco Use    Smoking status: Some Days     Packs/day: 0.25     Types: Cigarettes    Smokeless tobacco: Never   Substance and Sexual Activity    Alcohol use: Not Currently    Drug use: Yes     Types: Marijuana (Weed)       SCREENINGS         [place] Coma Scale  Eye Opening: Spontaneous  Best Verbal Response: Oriented  Best Motor Response: Obeys commands  [place] Coma Scale Score: 15                     CIWA Assessment  BP: 119/77  Heart Rate: 71                 PHYSICAL EXAM    (up to 7 for level 4, 8 or more for level 5)     ED Triage Vitals [03/17/23 0825]   BP Temp Temp src Heart Rate Resp SpO2 Height Weight   137/76 97.9 °F (36.6 °C) -- 72 20 99 % 5' 6\" (1.676 m) 185 lb (83.9 kg)       Physical Exam  Vitals and nursing note reviewed. Constitutional:       General: She is in acute distress. Appearance: She is well-developed. She is obese. She is not ill-appearing, toxic-appearing or diaphoretic. HENT:      Head: Normocephalic and atraumatic. Mouth/Throat:      Mouth: Mucous membranes are moist.      Pharynx: Oropharynx is clear. Cardiovascular:      Rate and Rhythm: Normal rate and regular rhythm. Heart sounds: Normal heart sounds. No murmur heard. No friction rub. No gallop. Pulmonary:      Effort: Pulmonary effort is normal. No respiratory distress. Breath sounds: Normal breath sounds. No stridor. No wheezing or rhonchi. Abdominal:      General: Abdomen is flat and protuberant. There is no distension or abdominal bruit. There are no signs of injury. Palpations: Abdomen is rigid. There is no shifting dullness, fluid wave, hepatomegaly, splenomegaly, mass or pulsatile mass. Tenderness: There is abdominal tenderness in the right upper quadrant. There is guarding. There is no rebound. Positive signs include Gardner's sign.  Negative signs include Rovsing's sign, McBurney's sign, psoas sign and obturator sign. Skin:     General: Skin is warm and dry. Capillary Refill: Capillary refill takes less than 2 seconds. Coloration: Skin is not cyanotic, jaundiced, mottled or pale. Findings: No erythema or rash. Neurological:      General: No focal deficit present. Mental Status: She is alert and oriented to person, place, and time. Cranial Nerves: No cranial nerve deficit. Motor: No weakness. DIAGNOSTIC RESULTS     EKG: All EKG's are interpreted by the Emergency Department Physician who either signs or Co-signs this chart in the absence of a cardiologist.        RADIOLOGY:   Non-plain film images such as CT, Ultrasound and MRI are read by the radiologist. Plain radiographic images are visualized and preliminarily interpreted by the emergency physician with the below findings:        Interpretation per the Radiologist below, if available at the time of this note:    [facility] [place] organ? GALLBLADDER   Final Result   Cholelithiasis. There is a gallstone which is in the neck of the   gallbladder and does not move with change in position. Patient does complain of   pain over the region of the gallbladder may represent element of cholecystitis. There is no wall thickening or pericholecystic fluid identified.                       ED BEDSIDE ULTRASOUND:   Performed by ED Physician - none    LABS:  Labs Reviewed   CBC WITH AUTO DIFFERENTIAL - Abnormal; Notable for the following components:       Result Value    RDW 15.0 (*)     All other components within normal limits   COMPREHENSIVE METABOLIC PANEL - Abnormal; Notable for the following components:    Glucose 114 (*)     All other components within normal limits   URINALYSIS - Abnormal; Notable for the following components:    pH, Urine 8.5 (*)     Protein, UA Trace (*)     All other components within normal limits   URINALYSIS, MICRO - Abnormal; Notable for the following components:    BACTERIA, URINE 1+ (*) All other components within normal limits   LIPASE   PREGNANCY, URINE       All other labs were within normal range or not returned as of this dictation. EMERGENCY DEPARTMENT COURSE and DIFFERENTIAL DIAGNOSIS/MDM:   Vitals:    Vitals:    03/17/23 0825 03/17/23 0936 03/17/23 1037   BP: 137/76 (!) 141/84 119/77   Pulse: 72 69 71   Resp: 20 18 16   Temp: 97.9 °F (36.6 °C)     SpO2: 99% 100% 99%   Weight: 185 lb (83.9 kg)     Height: 5' 6\" (1.676 m)             Medical Decision Making  Primary concern for recurrent biliary colic versus cholecystitis. She will have a formal ultrasound since she is nontoxic and stable. Case discussed with the patient's primary surgeon who agreed to outpatient follow-up on Monday. Patient understands the return precautions and is amenable to outpatient treatment. She will return because of refractory pain and fever. She also understands the dietary changes that need to happen in the interim to reduce the frequency and severity of biliary colic. Amount and/or Complexity of Data Reviewed  External Data Reviewed: labs, radiology and ECG. Labs: ordered. Radiology: ordered and independent interpretation performed. Risk  Prescription drug management. REASSESSMENT     ED Course as of 03/17/23 1151   Fri Mar 17, 2023   1048 No surgery on-call; however, patient does need urgent surgery for which transfer center was contacted for the 13 mm impacted gallstone. [SS]      ED Course User Index  [SS] Wayne Rojas MD         CRITICAL CARE TIME   Total Critical Care time was  minutes, excluding separately reportable procedures. There was a high probability of clinically significant/life threatening deterioration in the patient's condition which required my urgent intervention. CONSULTS:  None    PROCEDURES:  Unless otherwise noted below, none     Procedures        FINAL IMPRESSION      1.  Calculus of gallbladder with biliary obstruction but without cholecystitis DISPOSITION/PLAN   DISPOSITION Decision To Discharge 03/17/2023 11:39:48 AM      PATIENT REFERRED TO:  Asad Courtney MD  [address] [place] [address]  [phone]    Schedule an appointment as soon as possible for a visit       DISCHARGE MEDICATIONS:  New Prescriptions    ONDANSETRON (ZOFRAN) 4 MG TABLET    Take 1 tablet by mouth 3 times daily as needed for Nausea or Vomiting    OXYCODONE-ACETAMINOPHEN (PERCOCET) 5-325 MG PER TABLET    Take 1 tablet by mouth every 6 hours as needed for Pain for up to 3 days. Intended supply: 3 days. Take lowest dose possible to manage pain Max Daily Amount: 4 tablets     Controlled Substances Monitoring:     No flowsheet data found.     (Please note that portions of this note were completed with a voice recognition program.  Efforts were made to edit the dictations but occasionally words are mis-transcribed.)    Ken Gonzalez MD (electronically signed)  Attending Emergency Physician            Esther Alvarez MD  03/17/23 [phone]

## 2023-03-17 NOTE — DISCHARGE INSTRUCTIONS
Return to the ED if you have fever greater than 101.0 Fahrenheit, pain that is refractory to Motrin and the Percocet.